# Patient Record
Sex: FEMALE | Race: OTHER | Employment: FULL TIME | URBAN - METROPOLITAN AREA
[De-identification: names, ages, dates, MRNs, and addresses within clinical notes are randomized per-mention and may not be internally consistent; named-entity substitution may affect disease eponyms.]

---

## 2022-08-16 ENCOUNTER — HOSPITAL ENCOUNTER (OUTPATIENT)
Age: 37
Discharge: HOME | End: 2022-08-16
Attending: OBSTETRICS & GYNECOLOGY | Admitting: OBSTETRICS & GYNECOLOGY
Payer: COMMERCIAL

## 2022-08-16 VITALS
WEIGHT: 214 LBS | RESPIRATION RATE: 19 BRPM | HEIGHT: 63 IN | SYSTOLIC BLOOD PRESSURE: 134 MMHG | BODY MASS INDEX: 37.92 KG/M2 | HEART RATE: 93 BPM | DIASTOLIC BLOOD PRESSURE: 61 MMHG | TEMPERATURE: 98.1 F

## 2022-08-16 PROBLEM — Z3A.33 33 WEEKS GESTATION OF PREGNANCY: Status: ACTIVE | Noted: 2022-08-16

## 2022-08-16 PROBLEM — O36.8390 FETAL HEART RATE DECELERATIONS AFFECTING MANAGEMENT OF MOTHER: Status: ACTIVE | Noted: 2022-08-16

## 2022-08-16 LAB
APPEARANCE UR: CLEAR
BILIRUB UR QL: NEGATIVE
COLOR UR: YELLOW
GLUCOSE UR QL STRIP.AUTO: NEGATIVE MG/DL
KETONES UR-MCNC: NEGATIVE MG/DL
LEUKOCYTE ESTERASE UR QL STRIP: ABNORMAL
NITRITE UR QL: NEGATIVE
PH UR: 6 [PH] (ref 5–9)
PROT UR QL: 30 MG/DL
RBC # UR STRIP: NEGATIVE /UL
SERVICE CMNT-IMP: ABNORMAL
SP GR UR: >1.03 (ref 1–1.02)
UROBILINOGEN UR QL: 0.2 EU/DL (ref 0.2–1)

## 2022-08-16 PROCEDURE — 99282 EMERGENCY DEPT VISIT SF MDM: CPT

## 2022-08-16 PROCEDURE — 74011250636 HC RX REV CODE- 250/636: Performed by: ADVANCED PRACTICE MIDWIFE

## 2022-08-16 PROCEDURE — 81003 URINALYSIS AUTO W/O SCOPE: CPT

## 2022-08-16 PROCEDURE — 59025 FETAL NON-STRESS TEST: CPT

## 2022-08-16 RX ORDER — LANOLIN ALCOHOL/MO/W.PET/CERES
CREAM (GRAM) TOPICAL
COMMUNITY

## 2022-08-16 RX ORDER — VITAMIN A ACETATE, BETA CAROTENE, ASCORBIC ACID, CHOLECALCIFEROL, .ALPHA.-TOCOPHEROL ACETATE, DL-, THIAMINE MONONITRATE, RIBOFLAVIN, NIACINAMIDE, PYRIDOXINE HYDROCHLORIDE, FOLIC ACID, CYANOCOBALAMIN, CALCIUM CARBONATE, FERROUS FUMARATE, ZINC OXIDE, CUPRIC OXIDE 3080; 12; 120; 400; 1; 1.84; 3; 20; 22; 920; 25; 200; 27; 10; 2 [IU]/1; UG/1; MG/1; [IU]/1; MG/1; MG/1; MG/1; MG/1; MG/1; [IU]/1; MG/1; MG/1; MG/1; MG/1; MG/1
1 TABLET, FILM COATED ORAL DAILY
COMMUNITY

## 2022-08-16 RX ORDER — SODIUM CHLORIDE, SODIUM LACTATE, POTASSIUM CHLORIDE, CALCIUM CHLORIDE 600; 310; 30; 20 MG/100ML; MG/100ML; MG/100ML; MG/100ML
250 INJECTION, SOLUTION INTRAVENOUS ONCE
Status: COMPLETED | OUTPATIENT
Start: 2022-08-16 | End: 2022-08-16

## 2022-08-16 RX ADMIN — SODIUM CHLORIDE, POTASSIUM CHLORIDE, SODIUM LACTATE AND CALCIUM CHLORIDE 2000 ML: 600; 310; 30; 20 INJECTION, SOLUTION INTRAVENOUS at 12:45

## 2022-08-16 RX ADMIN — SODIUM CHLORIDE, POTASSIUM CHLORIDE, SODIUM LACTATE AND CALCIUM CHLORIDE 250 ML/HR: 600; 310; 30; 20 INJECTION, SOLUTION INTRAVENOUS at 14:24

## 2022-08-16 RX ADMIN — SODIUM CHLORIDE, POTASSIUM CHLORIDE, SODIUM LACTATE AND CALCIUM CHLORIDE 1000 ML: 600; 310; 30; 20 INJECTION, SOLUTION INTRAVENOUS at 13:27

## 2022-08-16 NOTE — ROUTINE PROCESS
26  33w4d Pt ambulated to the unit from the office for prolonged monitoring due to non reactive NST in the office. Pt taken to Triage 1, oriented to the room and call light. 1220 CNM on the unit.

## 2022-08-16 NOTE — DISCHARGE INSTRUCTIONS
Counting Your Baby's Kicks: Care Instructions  Overview     Counting your baby's kicks is one way your doctor can tell that your baby is healthy. Most women--especially in a first pregnancy--feel their baby move for the first time between 16 and 22 weeks. The movement may feel like flutters rather than kicks. Your baby may move more at certain times of the day. When you are active, you may notice less kicking than when you are resting. At your prenatal visits, your doctor will ask whether the baby is active. In your last trimester, your doctor may ask you to count the number of times you feel your baby move. Follow-up care is a key part of your treatment and safety. Be sure to make and go to all appointments, and call your doctor if you are having problems. It's also a good idea to know your test results and keep a list of the medicines you take. How do you count fetal kicks? A common method of checking your baby's movement is to note the length of time it takes to count ten movements (such as kicks, flutters, or rolls). Pick your baby's most active time of day to count. This may be any time from morning to evening. If you don't feel 10 movements in an hour, have something to eat or drink and count for another hour. If you don't feel at least 10 movements in the 2-hour period, call your doctor. When should you call for help? Call your doctor now or seek immediate medical care if:    You noticed that your baby has stopped moving or is moving much less than normal.   Watch closely for changes in your health, and be sure to contact your doctor if you have any problems. Where can you learn more? Go to http://www.gray.com/  Enter [id number] in the search box to learn more about \"Counting Your Baby's Kicks: Care Instructions. \"  Current as of: June 16, 2021               Content Version: 13.2  © 8466-7965 Healthwise, [employer].    Care instructions adapted under license by [facility] (which disclaims liability or warranty for this information). If you have questions about a medical condition or this instruction, always ask your healthcare professional. [address] any warranty or liability for your use of this information. Pregnancy Precautions: Care Instructions  Your Care Instructions     There is no sure way to prevent labor before your due date ( labor) or to prevent most other pregnancy problems. But there are things you can do to increase your chances of a healthy pregnancy. Go to your appointments, follow your doctor's advice, and take good care of yourself. Eat well, and exercise (if your doctor agrees). And make sure to drink plenty of water. Follow-up care is a key part of your treatment and safety. Be sure to make and go to all appointments, and call your doctor if you are having problems. It's also a good idea to know your test results and keep a list of the medicines you take. How can you care for yourself at home? Make sure you go to your prenatal appointments. At each visit, your doctor will check your blood pressure. Your doctor will also check to see if you have protein in your urine. High blood pressure and protein in urine are signs of preeclampsia. This condition can be dangerous for you and your baby. Drink plenty of fluids. Dehydration can cause contractions. If you have kidney, heart, or liver disease and have to limit fluids, talk with your doctor before you increase the amount of fluids you drink. Tell your doctor right away if you notice any symptoms of an infection, such as:  Burning when you urinate. A foul-smelling discharge from your vagina. Vaginal itching. Unexplained fever. Unusual pain or soreness in your uterus or lower belly. Eat a balanced diet. Include plenty of foods that are high in calcium and iron. Foods high in calcium include milk, cheese, yogurt, almonds, and broccoli.   Foods high in iron include red meat, shellfish, poultry, eggs, beans, raisins, whole-grain bread, and leafy green vegetables. Do not smoke. If you need help quitting, talk to your doctor about stop-smoking programs and medicines. These can increase your chances of quitting for good. Do not drink alcohol or use marijuana or illegal drugs. Follow your doctor's directions about activity. Your doctor will let you know how much, if any, exercise you can do. Ask your doctor if you can have sex. If you are at risk for early labor, your doctor may ask you to not have sex. Take care to prevent falls. During pregnancy, your joints are loose, and your balance is off. Sports such as bicycling, skiing, or in-line skating can increase your risk of falling. And don't ride horses or motorcycles, dive, water ski, scuba dive, or parachute jump while you are pregnant. Avoid things that can make your body too hot and may be harmful to your baby, such as a hot tub or sauna. Or talk with your doctor before doing anything that raises your body temperature. Your doctor can tell you if it's safe. Do not take any over-the-counter or herbal medicines or supplements without talking to your doctor or pharmacist first.  When should you call for help? Call 911  anytime you think you may need emergency care. For example, call if:    You passed out (lost consciousness). You have a seizure. You have severe vaginal bleeding. You have severe pain in your belly or pelvis. You have had fluid gushing or leaking from your vagina and you know or think the umbilical cord is bulging into your vagina. If this happens, immediately get down on your knees so your rear end (buttocks) is higher than your head. This will decrease the pressure on the cord until help arrives. Call your doctor now or seek immediate medical care if:    You have signs of preeclampsia, such as:  Sudden swelling of your face, hands, or feet.   New vision problems (such as dimness, blurring, or seeing spots). A severe headache. You have any vaginal bleeding. You have belly pain or cramping. You have a fever. You have had regular contractions (with or without pain) for an hour. This means that you have 8 or more within 1 hour or 4 or more in 20 minutes after you change your position and drink fluids. You have a sudden release of fluid from your vagina. You have low back pain or pelvic pressure that does not go away. You notice that your baby has stopped moving or is moving much less than normal.   Watch closely for changes in your health, and be sure to contact your doctor if you have any problems. Where can you learn more? Go to http://www.cooley.com/  Enter Y951 in the search box to learn more about \"Pregnancy Precautions: Care Instructions. \"  Current as of: June 16, 2021               Content Version: 13.2  © 6218-7346 [employer]. Care instructions adapted under license by [employer] (which disclaims liability or warranty for this information). If you have questions about a medical condition or this instruction, always ask your healthcare professional. [address] any warranty or liability for your use of this information.

## 2022-08-16 NOTE — PROGRESS NOTES
Pt stable for discharge. Instructions reviewed to include kick counts and labor precautions. No questions or concerns.

## 2022-08-23 ENCOUNTER — HOSPITAL ENCOUNTER (INPATIENT)
Age: 37
LOS: 4 days | Discharge: HOME | End: 2022-08-27
Attending: OBSTETRICS & GYNECOLOGY | Admitting: OBSTETRICS & GYNECOLOGY
Payer: COMMERCIAL

## 2022-08-23 DIAGNOSIS — Z98.891 STATUS POST PRIMARY LOW TRANSVERSE CESAREAN SECTION: Primary | ICD-10-CM

## 2022-08-23 PROBLEM — Z34.93 NORMAL IUP (INTRAUTERINE PREGNANCY) ON PRENATAL ULTRASOUND, THIRD TRIMESTER: Status: ACTIVE | Noted: 2022-08-23

## 2022-08-23 PROBLEM — O36.5990 IUGR (INTRAUTERINE GROWTH RESTRICTION) AFFECTING CARE OF MOTHER: Status: ACTIVE | Noted: 2022-08-23

## 2022-08-23 PROBLEM — Z3A.34 34 WEEKS GESTATION OF PREGNANCY: Status: ACTIVE | Noted: 2022-08-23

## 2022-08-23 LAB
ABO + RH BLD: NORMAL
APPEARANCE UR: CLEAR
BILIRUB UR QL: NEGATIVE
BLOOD GROUP ANTIBODIES SERPL: NORMAL
COLOR UR: YELLOW
ERYTHROCYTE [DISTWIDTH] IN BLOOD BY AUTOMATED COUNT: 16.9 % (ref 11.6–14.5)
GLUCOSE UR QL STRIP.AUTO: NEGATIVE MG/DL
HCT VFR BLD AUTO: 37.1 % (ref 35–45)
HGB BLD-MCNC: 11.6 G/DL (ref 12–16)
KETONES UR-MCNC: NEGATIVE MG/DL
LEUKOCYTE ESTERASE UR QL STRIP: ABNORMAL
MCH RBC QN AUTO: 27.4 PG (ref 24–34)
MCHC RBC AUTO-ENTMCNC: 31.3 G/DL (ref 31–37)
MCV RBC AUTO: 87.5 FL (ref 78–100)
NITRITE UR QL: NEGATIVE
NRBC # BLD: 0 K/UL (ref 0–0.01)
NRBC BLD-RTO: 0 PER 100 WBC
PH UR: 6.5 [PH] (ref 5–9)
PLATELET # BLD AUTO: 187 K/UL (ref 135–420)
PMV BLD AUTO: 11.7 FL (ref 9.2–11.8)
PROT UR QL: NEGATIVE MG/DL
RBC # BLD AUTO: 4.24 M/UL (ref 4.2–5.3)
RBC # UR STRIP: ABNORMAL /UL
SERVICE CMNT-IMP: ABNORMAL
SP GR UR: 1.01 (ref 1–1.02)
SPECIMEN EXP DATE BLD: NORMAL
UROBILINOGEN UR QL: 0.2 EU/DL (ref 0.2–1)
WBC # BLD AUTO: 8.7 K/UL (ref 4.6–13.2)

## 2022-08-23 PROCEDURE — 87147 CULTURE TYPE IMMUNOLOGIC: CPT

## 2022-08-23 PROCEDURE — 74011250636 HC RX REV CODE- 250/636

## 2022-08-23 PROCEDURE — 0UB90ZZ EXCISION OF UTERUS, OPEN APPROACH: ICD-10-PCS | Performed by: OBSTETRICS & GYNECOLOGY

## 2022-08-23 PROCEDURE — 85027 COMPLETE CBC AUTOMATED: CPT

## 2022-08-23 PROCEDURE — 87081 CULTURE SCREEN ONLY: CPT

## 2022-08-23 PROCEDURE — 86900 BLOOD TYPING SEROLOGIC ABO: CPT

## 2022-08-23 PROCEDURE — 81003 URINALYSIS AUTO W/O SCOPE: CPT

## 2022-08-23 PROCEDURE — G0378 HOSPITAL OBSERVATION PER HR: HCPCS

## 2022-08-23 PROCEDURE — 65270000029 HC RM PRIVATE

## 2022-08-23 RX ORDER — DIPHENHYDRAMINE HCL 25 MG
25 CAPSULE ORAL
Status: DISCONTINUED | OUTPATIENT
Start: 2022-08-23 | End: 2022-08-27 | Stop reason: HOSPADM

## 2022-08-23 RX ORDER — BETAMETHASONE SODIUM PHOSPHATE AND BETAMETHASONE ACETATE 3; 3 MG/ML; MG/ML
12 INJECTION, SUSPENSION INTRA-ARTICULAR; INTRALESIONAL; INTRAMUSCULAR; SOFT TISSUE EVERY 12 HOURS
Status: COMPLETED | OUTPATIENT
Start: 2022-08-23 | End: 2022-08-24

## 2022-08-23 RX ORDER — SODIUM CHLORIDE 0.9 % (FLUSH) 0.9 %
5-40 SYRINGE (ML) INJECTION AS NEEDED
Status: DISCONTINUED | OUTPATIENT
Start: 2022-08-23 | End: 2022-08-25 | Stop reason: SDUPTHER

## 2022-08-23 RX ORDER — ACETAMINOPHEN 500 MG
1000 TABLET ORAL
Status: DISCONTINUED | OUTPATIENT
Start: 2022-08-23 | End: 2022-08-27 | Stop reason: HOSPADM

## 2022-08-23 RX ORDER — SODIUM CHLORIDE 0.9 % (FLUSH) 0.9 %
5-40 SYRINGE (ML) INJECTION EVERY 8 HOURS
Status: DISCONTINUED | OUTPATIENT
Start: 2022-08-23 | End: 2022-08-25 | Stop reason: SDUPTHER

## 2022-08-23 RX ADMIN — BETAMETHASONE SODIUM PHOSPHATE AND BETAMETHASONE ACETATE 12 MG: 3; 3 INJECTION, SUSPENSION INTRA-ARTICULAR; INTRALESIONAL; INTRAMUSCULAR at 19:09

## 2022-08-23 NOTE — PROGRESS NOTES
1910 Bedside and verbal report received by R. Geronimo Buerger, care assumed at this time. 0147 500ML IV fluid bolus started, pt turned to left side. 0430  CHEYENNE Barba CNM aware of prolonged decelerations. Will continue to monitor. 0715 Bedside shift change report given to YOHANA Abraham (oncoming nurse) by Paulette Gamino RN (offgoing nurse). Report included the following information SBAR, Kardex, Procedure Summary, Intake/Output, MAR, Accordion, Recent Results, and Quality Measures.

## 2022-08-23 NOTE — ROUTINE PROCESS
Girish Cueva 26 Pt ambulated to the unit from the office due to non reactive NST. Pt taken to room 8, oriented to the room and call light. 1645 CNM on the unit, FHR strip renewed. 1745 CNM at the bedside. 1910 Bedside and Verbal shift change report given to Harvinder Castro RN  (oncoming nurse) by VIVIANE Louis  (offgoing nurse). Report included the following information SBAR, Kardex, Procedure Summary, Intake/Output, MAR, Accordion, Recent Results, and Med Rec Status.

## 2022-08-23 NOTE — DISCHARGE INSTRUCTIONS
POST DELIVERY DISCHARGE INSTRUCTIONS    Name: Cele Garcia  YOB: 1985  Primary Diagnosis: Active Problems:    34 weeks gestation of pregnancy (8/23/2022)      IUGR (intrauterine growth restriction) affecting care of mother (8/23/2022)      Normal IUP (intrauterine pregnancy) on prenatal ultrasound, third trimester (8/23/2022)      IUP (intrauterine pregnancy), incidental (8/25/2022)      Category II fetal heart rate tracing during labor and delivery (8/25/2022)        General:     Diet/Diet Restrictions:  Eight 8-ounce glasses of fluid daily (water, juices); avoid excessive caffeine intake. Meals/snacks as desired which are high in fiber and carbohydrates and low in fat and cholesterol. Physical Activity / Restrictions / Safety:     Avoid heavy lifting, no more that 8 lbs. For 2-3 weeks; limit use of stairs to 2 times daily for the first week home. No driving for one week. Avoid intercourse 4-6 weeks, no douching or tampon use. Check with obstetrician before starting or resuming an exercise program.         Discharge Instructions/Special Treatment/Home Care Needs:     Continue prenatal vitamins. Continue to use squirt bottle with warm water on your episiotomy after each bathroom use until bleeding stops. If steri-strips applied to your incision, remove in 7-10 days. Call your doctor for the following:     Fever over 101 degrees by mouth. Vaginal bleeding heavier than a normal menstrual period or clot larger than a golf ball. Red streaks or increased swelling of legs, painful red streaks on your breast.  Painful urination, constipation and increased pain or swelling or discharge with your incision. If you feel extremely anxious or overwhelmed. If you have thoughts of harming yourself and/or your baby. Pain Management:     Pain Management:   Take Acetaminophen (Tylenol) or Ibuprofen (Advil, Motrin), as directed for pain.  Use a warm Sitz bath 3 times daily to relieve episiotomy or hemorrhoidal discomfort. Heating pad to  incision as needed. For hemorrhoidal discomfort, use Tucks and Anusol cream as needed and directed. Follow-Up Care: These are general instructions for a healthy lifestyle:    No smoking/ No tobacco products/ Avoid exposure to second hand smoke    Surgeon General's Warning:  Quitting smoking now greatly reduces serious risk to your health. Obesity, smoking, and sedentary lifestyle greatly increases your risk for illness    A healthy diet, regular physical exercise & weight monitoring are important for maintaining a healthy lifestyle    Recognize signs and symptoms of STROKE:    F-face looks uneven    A-arms unable to move or move unevenly    S-speech slurred or non-existent    T-time-call 911 as soon as signs and symptoms begin-DO NOT go       Back to bed or wait to see if you get better-TIME IS BRAIN. Depression After Childbirth: Care Instructions  Overview     Many women get the \"baby blues\" during the first few days after childbirth. You may lose sleep, feel irritable, and cry easily. You may feel happy one minute and sad the next. Hormone changes are one cause of these emotional changes. Also, the demands of a new baby, along with visits from relatives or other family needs, can add to the stress. The \"baby blues\" often peak around the fourth day. Then they ease up in less than 2 weeks. If your moodiness or anxiety lasts for more than 2 weeks, or if you feel like life is not worth living, you may have postpartum depression. This is different for each person. Some mothers with serious depression may worry intensely about their infant's well-being. Others may feel distant from their child. Some mothers may even feel that they might harm their baby. Some may have signs of paranoia, wondering if someone is watching them. Depression is not a sign of weakness. It's a medical condition that requires treatment.  Medicine and counseling often work well to reduce depression. Talk to your doctor about taking antidepressant medicine while breastfeeding. Follow-up care is a key part of your treatment and safety. Be sure to make and go to all appointments, and call your doctor if you are having problems. It's also a good idea to know your test results and keep a list of the medicines you take. How do you know if you are depressed? With all the changes in your life, you may not know if you are depressed. Pregnancy sometimes causes changes in how you feel that are similar to the symptoms of depression. Symptoms of depression include:  Feeling sad or hopeless and losing interest in daily activities. These are the most common symptoms of depression. Sleeping too much or not enough. Feeling tired. You may feel as if you have no energy. Eating too much or too little. Writing or talking about death, such as writing suicide notes or talking about guns, knives, or pills. Keep the numbers for these national suicide hotlines: 8-919-546-TALK (7-229.710.3938) and 3-748-DTJQIGL (7-562.471.7851). If you or someone you know talks about suicide or feeling hopeless, get help right away. How can you care for yourself at home? Be safe with medicines. Take your medicines exactly as prescribed. Call your doctor if you think you are having a problem with your medicine. Eat a healthy diet so that you can keep up your energy. Get regular daily exercise, such as walks, to help improve your mood. Get as much sunlight as possible. Keep your shades and curtains open. Get outside as much as you can. Avoid using alcohol or other substances to feel better. Get as much rest and sleep as possible. Avoid doing too much. Being too tired can increase depression. Play stimulating music throughout your day and soothing music at night. Schedule outings and visits with friends and family. Ask them to call you regularly, so that you don't feel alone.   Ask for help with preparing food and other daily tasks. Family and friends are often happy to help with a . Be honest with yourself and those who care about you. Tell them about your struggle. Join a support group of new mothers. No one can better understand the challenges of caring for a  than other new mothers. If you feel like life is not worth living or you're feeling hopeless, get help right away. Keep the numbers for these national suicide hotlines: 1-701-897-TALK (3-346.962.9630) and 3-987-GIXMSHF (6-304.380.1392). When should you call for help? Call 911 anytime you think you may need emergency care. For example, call if:    You feel you cannot stop from hurting yourself, your baby, or someone else. Call your doctor now or seek immediate medical care if:    You are having trouble caring for yourself or your baby. You hear voices. Watch closely for changes in your health, and be sure to contact your doctor if:    You have problems with your depression medicine. You do not get better as expected. Where can you learn more? Go to http://www.gray.com/  Enter N090874 in the search box to learn more about \"Depression After Childbirth: Care Instructions. \"  Current as of: 2021               Content Version: 13.2  © 0207-3846 Healthwise[employer]. Care instructions adapted under license by [employer] (which disclaims liability or warranty for this information). If you have questions about a medical condition or this instruction, always ask your healthcare professional. [place] [address] any warranty or liability for your use of this information.

## 2022-08-24 ENCOUNTER — ANESTHESIA (OUTPATIENT)
Dept: LABOR AND DELIVERY | Age: 37
End: 2022-08-24
Payer: COMMERCIAL

## 2022-08-24 ENCOUNTER — ANESTHESIA EVENT (OUTPATIENT)
Dept: LABOR AND DELIVERY | Age: 37
End: 2022-08-24
Payer: COMMERCIAL

## 2022-08-24 PROCEDURE — 77030010507 HC ADH SKN DERMBND J&J -B: Performed by: OBSTETRICS & GYNECOLOGY

## 2022-08-24 PROCEDURE — 75410000003 HC RECOV DEL/VAG/CSECN EA 0.5 HR

## 2022-08-24 PROCEDURE — 74011250636 HC RX REV CODE- 250/636

## 2022-08-24 PROCEDURE — 2709999900 HC NON-CHARGEABLE SUPPLY: Performed by: OBSTETRICS & GYNECOLOGY

## 2022-08-24 PROCEDURE — 77030002933 HC SUT MCRYL J&J -A: Performed by: OBSTETRICS & GYNECOLOGY

## 2022-08-24 PROCEDURE — 76060000033 HC ANESTHESIA 1 TO 1.5 HR: Performed by: OBSTETRICS & GYNECOLOGY

## 2022-08-24 PROCEDURE — 76010000392 HC C SECN EA ADDL 0.5 HR: Performed by: OBSTETRICS & GYNECOLOGY

## 2022-08-24 PROCEDURE — 75410000003 HC RECOV DEL/VAG/CSECN EA 0.5 HR: Performed by: OBSTETRICS & GYNECOLOGY

## 2022-08-24 PROCEDURE — 74011250636 HC RX REV CODE- 250/636: Performed by: ANESTHESIOLOGY

## 2022-08-24 PROCEDURE — 77030040830 HC CATH URETH FOL MDII -A

## 2022-08-24 PROCEDURE — 77030031139 HC SUT VCRL2 J&J -A: Performed by: OBSTETRICS & GYNECOLOGY

## 2022-08-24 PROCEDURE — 77030040361 HC SLV COMPR DVT MDII -B

## 2022-08-24 PROCEDURE — 74011250636 HC RX REV CODE- 250/636: Performed by: ADVANCED PRACTICE MIDWIFE

## 2022-08-24 PROCEDURE — 77030040361 HC SLV COMPR DVT MDII -B: Performed by: OBSTETRICS & GYNECOLOGY

## 2022-08-24 PROCEDURE — 77030011265 HC ELECTRD BLD HEX COVD -A: Performed by: OBSTETRICS & GYNECOLOGY

## 2022-08-24 PROCEDURE — 65270000029 HC RM PRIVATE

## 2022-08-24 PROCEDURE — 77030008459 HC STPLR SKN COOP -B: Performed by: OBSTETRICS & GYNECOLOGY

## 2022-08-24 PROCEDURE — 77030027138 HC INCENT SPIROMETER -A

## 2022-08-24 PROCEDURE — 88305 TISSUE EXAM BY PATHOLOGIST: CPT

## 2022-08-24 PROCEDURE — 88307 TISSUE EXAM BY PATHOLOGIST: CPT

## 2022-08-24 PROCEDURE — G0378 HOSPITAL OBSERVATION PER HR: HCPCS

## 2022-08-24 PROCEDURE — 76010000391 HC C SECN FIRST 1 HR: Performed by: OBSTETRICS & GYNECOLOGY

## 2022-08-24 PROCEDURE — 74011000250 HC RX REV CODE- 250: Performed by: ANESTHESIOLOGY

## 2022-08-24 RX ORDER — OXYTOCIN/RINGER'S LACTATE 30/500 ML
10 PLASTIC BAG, INJECTION (ML) INTRAVENOUS AS NEEDED
Status: DISCONTINUED | OUTPATIENT
Start: 2022-08-24 | End: 2022-08-27 | Stop reason: HOSPADM

## 2022-08-24 RX ORDER — OXYTOCIN/0.9 % SODIUM CHLORIDE 30/500 ML
87.3 PLASTIC BAG, INJECTION (ML) INTRAVENOUS AS NEEDED
Status: COMPLETED | OUTPATIENT
Start: 2022-08-24 | End: 2022-08-24

## 2022-08-24 RX ORDER — CEFAZOLIN SODIUM/WATER 2 G/20 ML
2 SYRINGE (ML) INTRAVENOUS ONCE
Status: COMPLETED | OUTPATIENT
Start: 2022-08-24 | End: 2022-08-24

## 2022-08-24 RX ORDER — OXYCODONE AND ACETAMINOPHEN 5; 325 MG/1; MG/1
1-2 TABLET ORAL
Status: DISCONTINUED | OUTPATIENT
Start: 2022-08-24 | End: 2022-08-27 | Stop reason: HOSPADM

## 2022-08-24 RX ORDER — OXYTOCIN/0.9 % SODIUM CHLORIDE 30/500 ML
PLASTIC BAG, INJECTION (ML) INTRAVENOUS
Status: DISPENSED
Start: 2022-08-24 | End: 2022-08-25

## 2022-08-24 RX ORDER — SODIUM CHLORIDE, SODIUM LACTATE, POTASSIUM CHLORIDE, CALCIUM CHLORIDE 600; 310; 30; 20 MG/100ML; MG/100ML; MG/100ML; MG/100ML
125 INJECTION, SOLUTION INTRAVENOUS CONTINUOUS
Status: DISCONTINUED | OUTPATIENT
Start: 2022-08-24 | End: 2022-08-27 | Stop reason: HOSPADM

## 2022-08-24 RX ORDER — BUPIVACAINE HYDROCHLORIDE 7.5 MG/ML
INJECTION, SOLUTION INTRASPINAL
Status: SHIPPED | OUTPATIENT
Start: 2022-08-24 | End: 2022-08-24

## 2022-08-24 RX ORDER — MORPHINE SULFATE 1 MG/ML
INJECTION, SOLUTION EPIDURAL; INTRATHECAL; INTRAVENOUS
Status: SHIPPED | OUTPATIENT
Start: 2022-08-24 | End: 2022-08-24

## 2022-08-24 RX ORDER — FACIAL-BODY WIPES
10 EACH TOPICAL
Status: DISCONTINUED | OUTPATIENT
Start: 2022-08-24 | End: 2022-08-27 | Stop reason: HOSPADM

## 2022-08-24 RX ORDER — OXYTOCIN/RINGER'S LACTATE 30/500 ML
PLASTIC BAG, INJECTION (ML) INTRAVENOUS
Status: DISCONTINUED | OUTPATIENT
Start: 2022-08-24 | End: 2022-08-24 | Stop reason: HOSPADM

## 2022-08-24 RX ORDER — LANOLIN ALCOHOL/MO/W.PET/CERES
3 CREAM (GRAM) TOPICAL
Status: DISCONTINUED | OUTPATIENT
Start: 2022-08-24 | End: 2022-08-27 | Stop reason: HOSPADM

## 2022-08-24 RX ORDER — SIMETHICONE 80 MG
80 TABLET,CHEWABLE ORAL
Status: DISCONTINUED | OUTPATIENT
Start: 2022-08-24 | End: 2022-08-27 | Stop reason: HOSPADM

## 2022-08-24 RX ORDER — ONDANSETRON 2 MG/ML
INJECTION INTRAMUSCULAR; INTRAVENOUS AS NEEDED
Status: DISCONTINUED | OUTPATIENT
Start: 2022-08-24 | End: 2022-08-24 | Stop reason: HOSPADM

## 2022-08-24 RX ORDER — PROMETHAZINE HYDROCHLORIDE 25 MG/ML
25 INJECTION, SOLUTION INTRAMUSCULAR; INTRAVENOUS
Status: DISCONTINUED | OUTPATIENT
Start: 2022-08-24 | End: 2022-08-27 | Stop reason: HOSPADM

## 2022-08-24 RX ORDER — SODIUM CHLORIDE 0.9 % (FLUSH) 0.9 %
5-40 SYRINGE (ML) INJECTION AS NEEDED
Status: DISCONTINUED | OUTPATIENT
Start: 2022-08-24 | End: 2022-08-27 | Stop reason: HOSPADM

## 2022-08-24 RX ORDER — SODIUM CHLORIDE, SODIUM LACTATE, POTASSIUM CHLORIDE, CALCIUM CHLORIDE 600; 310; 30; 20 MG/100ML; MG/100ML; MG/100ML; MG/100ML
125 INJECTION, SOLUTION INTRAVENOUS CONTINUOUS
Status: DISPENSED | OUTPATIENT
Start: 2022-08-24 | End: 2022-08-25

## 2022-08-24 RX ORDER — KETOROLAC TROMETHAMINE 30 MG/ML
30 INJECTION, SOLUTION INTRAMUSCULAR; INTRAVENOUS EVERY 6 HOURS
Status: COMPLETED | OUTPATIENT
Start: 2022-08-24 | End: 2022-08-26

## 2022-08-24 RX ORDER — SODIUM CHLORIDE 0.9 % (FLUSH) 0.9 %
5-40 SYRINGE (ML) INJECTION EVERY 8 HOURS
Status: DISCONTINUED | OUTPATIENT
Start: 2022-08-24 | End: 2022-08-27 | Stop reason: HOSPADM

## 2022-08-24 RX ORDER — ACETAMINOPHEN 325 MG/1
650 TABLET ORAL
Status: DISCONTINUED | OUTPATIENT
Start: 2022-08-24 | End: 2022-08-27 | Stop reason: HOSPADM

## 2022-08-24 RX ORDER — IBUPROFEN 400 MG/1
800 TABLET ORAL
Status: DISCONTINUED | OUTPATIENT
Start: 2022-08-27 | End: 2022-08-27 | Stop reason: HOSPADM

## 2022-08-24 RX ADMIN — Medication 2 G: at 18:15

## 2022-08-24 RX ADMIN — Medication 600 ML/HR: at 18:30

## 2022-08-24 RX ADMIN — ONDANSETRON HYDROCHLORIDE 4 MG: 2 INJECTION INTRAMUSCULAR; INTRAVENOUS at 18:28

## 2022-08-24 RX ADMIN — BETAMETHASONE SODIUM PHOSPHATE AND BETAMETHASONE ACETATE 12 MG: 3; 3 INJECTION, SUSPENSION INTRA-ARTICULAR; INTRALESIONAL; INTRAMUSCULAR at 07:05

## 2022-08-24 RX ADMIN — Medication 87.3 MILLI-UNITS/MIN: at 21:23

## 2022-08-24 RX ADMIN — KETOROLAC TROMETHAMINE 30 MG: 30 INJECTION, SOLUTION INTRAMUSCULAR at 20:40

## 2022-08-24 RX ADMIN — SODIUM CHLORIDE, SODIUM LACTATE, POTASSIUM CHLORIDE, AND CALCIUM CHLORIDE: 600; 310; 30; 20 INJECTION, SOLUTION INTRAVENOUS at 18:29

## 2022-08-24 RX ADMIN — SODIUM CHLORIDE, POTASSIUM CHLORIDE, SODIUM LACTATE AND CALCIUM CHLORIDE 500 ML: 600; 310; 30; 20 INJECTION, SOLUTION INTRAVENOUS at 01:47

## 2022-08-24 RX ADMIN — BUPIVACAINE HYDROCHLORIDE IN DEXTROSE 12 MG: 7.5 INJECTION, SOLUTION SUBARACHNOID at 18:08

## 2022-08-24 RX ADMIN — MORPHINE SULFATE 0.2 MG: 1 INJECTION, SOLUTION EPIDURAL; INTRATHECAL; INTRAVENOUS at 18:08

## 2022-08-24 RX ADMIN — SODIUM CHLORIDE, SODIUM LACTATE, POTASSIUM CHLORIDE, AND CALCIUM CHLORIDE 125 ML/HR: 600; 310; 30; 20 INJECTION, SOLUTION INTRAVENOUS at 03:25

## 2022-08-24 NOTE — PROGRESS NOTES
0710 : Bedside and Verbal shift change report given to YOHANA Ratliff RN (oncoming nurse) by Lisa De La Garza RN (offgoing nurse). Report included the following information SBAR, Kardex, Intake/Output, MAR, Recent Results, and Med Rec Status. 0900 : pt eating breakfast, no complaints at this time    0930 : plan of care being discussed with midwife and MD    1500 : Bedside shift change report given to TIARRA Li RN (oncoming nurse) by Socrates Singh. Sandy Ratliff RN (offgoing nurse). Report included the following information SBAR, Kardex, Procedure Summary, Intake/Output, MAR, Recent Results, and Med Rec Status.

## 2022-08-24 NOTE — ANESTHESIA POSTPROCEDURE EVALUATION
Procedure(s):   SECTION. spinal    Anesthesia Post Evaluation      Multimodal analgesia: multimodal analgesia used between 6 hours prior to anesthesia start to PACU discharge  Patient location during evaluation: bedside  Patient participation: complete - patient participated  Level of consciousness: awake and alert  Pain score: 0  Airway patency: patent  Anesthetic complications: no  Cardiovascular status: acceptable  Respiratory status: acceptable  Hydration status: acceptable  Post anesthesia nausea and vomiting:  none  Final Post Anesthesia Temperature Assessment:  Normothermia (36.0-37.5 degrees C)      INITIAL Post-op Vital signs:   Vitals Value Taken Time   /59 220   Temp 36.7 °C (98.1 °F) 22   Pulse 68 22   Resp 18 22   SpO2 100 % 22   Vitals shown include unvalidated device data.

## 2022-08-24 NOTE — OP NOTES
Operative Note    Patient: Timothy Egan  YOB: 1985  MRN: [id number]    Date of Procedure: 2022     Pre-Op Diagnosis: Asymmetric growth, NRFHT    Post-Op Diagnosis: Same as preoperative diagnosis. and non reassuring fetal tracing, uterine fibroids       Procedure(s):   SECTION; Myomectomy    Surgeon(s):  Elsa Ferguson MD    Surgical Assistant: None    Anesthesia: Spinal     Estimated Blood Loss (mL):  945 mL    Complications: Other: unplanned myomectomy due to 5 cm uterine fibroid at incision site    Specimens:   ID Type Source Tests Collected by Time Destination   1 :  Fresh Placenta  Elsa Ferguson MD 2022 Pathology   2 :  Fresh OTHER (use comments)  Elsa Ferguson MD 2022 Pathology        Implants: * No implants in log *    Drains: * No LDAs found *    Findings: VFI, Apgars 8,9; weight pending, multiple uterine fibroids    Detailed Description of Procedure:       Patient was taken to the OR after informed consent had been obtained. Spinal  was then placed. She was then placed in a dorsal supine position with a left lateral tilt. She was then prepped and draped in a sterile fashion. Time out was completed. Attention was turned to the abdomen and an Allis test confirmed adequate anesthesia. A Pfannenstiel skin incision was made 3 cm above the symphysis pubis. The incision was carried down to the fascia. The fascia was opened in the midline with sharp dissection. The rectus muscle was divided bluntly. The peritoneum was entered with good visualization of underlying structures. The bladder blade was inserted. The vesicouterine peritoneum was incised in a semi lunar fashion and the bladder flap was created using blunt and sharp dissection. The uterus was scored in the lower uterine segment and then entered in the midline. A large 5 cm uterine fibroid was present just above the lower uterine segment.   The uterine incision was extended bilaterally, transversly. The fetal head was flexed, pressure was applied to the abdomen and the fetus was delivered through the uterine incision. Naso and oropharynx were bulb suctioned. The cord was clamped and cut. The infant was handed to the waiting pediatric nurse. The placenta was manually extracted. The uterus was cleared of all clot and debris. The fibroid was directly at the upper half of the incision and decision was made to remove the fibroid to allow closure of the uterine incision. The fibroid was removed with blunt and sharp dissection. The fibroid was then sent for permanent pathology. The incision was closed with 0 Vicryl in a running fashion. A second layer of 0 vicryl was placed in an imbricating fashion. Excellent hemostasis was noted. The rectus muscles were re approximated an interrupted suture of 0 vicryl in a figure of eight fashion. The fascia was closed in a running fashion with 0 vicryl. The skin was closed with Insorb staples and covered with a sterile dressing. The counts were correct. The mother and child tolerated the procedure well. Carlton Finch M.D.         Electronically Signed by Mendel Dean, MD on 8/24/2022 at 6:57 PM

## 2022-08-24 NOTE — PROGRESS NOTES
1600-  Verbal and Written shift change report given to HARISH Blas, LALA and TIARRA Li RN (oncoming nurse) by Pancho Cruz RN (offgoing nurse). Report included the following information SBAR and MAR.    1645- pre-op checklist done      1700- ambulated pt to the bathroom    Bedside and Verbal shift change report given to HARISH Trujillo (oncoming nurse) by HARISH Blas RN and Tony Grier RN (offgoing nurse). Report included the following information SBAR, OR Summary, Intake/Output, and MAR.

## 2022-08-24 NOTE — H&P
History & Physical    Name: Regulo Amador MRN: [id number]  SSN: [id number]    YOB: 1985  Age: 40 y.o. Sex: female      Subjective:     Estimated Date of Delivery: 22  OB History    Para Term  AB Living   1             SAB IAB Ectopic Molar Multiple Live Births                    # Outcome Date GA Lbr Jeanmarie/2nd Weight Sex Delivery Anes PTL Lv   1 Current                Ms. Francisco Mohan is admitted with pregnancy at 34w5d for IUGR and category II tracing. Patient was observation overnight and received steroid injections x2. Category II tracing still present this morning. Patient admitted as inpatient. Prenatal course was complicated by  anemia, AMA, and IUGR . Please see prenatal records for details. Past Medical History:   Diagnosis Date    Abnormal Papanicolaou smear of cervix     Anemia      Past Surgical History:   Procedure Laterality Date    HX OTHER SURGICAL N/A 2018    leap procedure     Social History     Occupational History    Not on file   Tobacco Use    Smoking status: Not on file    Smokeless tobacco: Not on file   Substance and Sexual Activity    Alcohol use: Not on file    Drug use: Not on file    Sexual activity: Not on file     No family history on file. No Known Allergies  Prior to Admission medications    Medication Sig Start Date End Date Taking? Authorizing Provider   prenatal vit-calcium-iron-fa (Prenatal Plus, calcium carb,) 27 mg iron- 1 mg tab Take 1 Tablet by mouth daily. Provider, Historical   ferrous sulfate (Iron) 325 mg (65 mg iron) tablet Take  by mouth Daily (before breakfast). Provider, Historical        Review of Systems: A comprehensive review of systems was negative except for that written in the History of Present Illness.     Objective:     Vitals:  Vitals:    22 1816 22 1821 22 1923 22 0055   BP:   129/82 (!) 140/88   Pulse:   (!) 108 68   Resp:   16 16   Temp:   98.4 °F (36.9 °C) 97.9 °F (36.6 °C)   SpO2: (!) 79% 100%     Weight:       Height:            Physical Exam:  Patient without distress. Lung: normal respiratory effort  Abdomen: soft, nontender  Fundus: soft and non tender  Perineum: blood absent, amniotic fluid absent  Cervical Exam: deferred  Membranes:  Intact  Fetal Heart Rate: Reactive  Variability: moderate  Accelerations: yes  Decelerations: late          Prenatal Labs:   Lab Results   Component Value Date/Time    ABO/Rh(D) A POSITIVE 2022 06:56 PM       Impression/Plan:     Active Problems:    34 weeks gestation of pregnancy (2022)      IUGR (intrauterine growth restriction) affecting care of mother (2022)      Normal IUP (intrauterine pregnancy) on prenatal ultrasound, third trimester (2022)         Plan:     Admit to L&D  Place IV/Routine labs  Continuous EFM  Category II tracing with late decels-overall reassuring  GBS unknown  Observation for 23hrs complete  Betamethasone x2 complete  Discussed patient status with Dr. Ta Alfredo to have  section this evening-patient ate breakfast and tracing is overall reassuring    Discussed POC with patient and family member. Both verbalized understanding        I have reviewed the H+P. I have met with the patient and her family. Plan primary LTCS for NRFHT - CAT II tracing, remote from delivery,s/p ANCS. Gallito Yip M.D.         Johanny Garcia CNM

## 2022-08-24 NOTE — PROGRESS NOTES
Bedside and Verbal shift change report given to TIARRA Li, RN and HARISH Bazzi, LALA (oncoming nurse) by Pilar Cha. Wilfredo Fernandes RN (offgoing nurse). Report included the following information SBAR, Intake/Output, MAR, and Recent Results. Pt NPO for C/S d/t decelerations during observation period. 1630- Pt prepped by HARISH Bazzi RN.     8417- Bedside and Verbal shift change report given to HARISH Andrews (oncoming nurse) by Genevieve Sifuentes. LALA Li and HARISH Bazzi RN (offgoing nurse). Report included the following information SBAR, Intake/Output, MAR, and Recent Results.

## 2022-08-24 NOTE — ANESTHESIA PROCEDURE NOTES
Spinal Block    Start time: 8/24/2022 6:04 PM  End time: 8/24/2022 6:08 PM  Performed by: Eliza Hughes CRNA  Authorized by: Ronnie Lentz MD     Pre-procedure: Indications: at surgeon's request, procedure for pain and primary anesthetic  Preanesthetic Checklist: patient identified, risks and benefits discussed, anesthesia consent, site marked, patient being monitored, timeout performed and fire risk safety assessment completed and verbalized    Timeout Time: 18:04 EDT      Spinal Block:   Patient Position:  Seated  Prep Region:  Lumbar  Prep: chlorhexidine      Location:  L4-5  Technique:  Single shot  Local: bupivacaine 0.75% in dextrose 8.25% preserv-free (SENSORCAINE) Intrathecal - Intrathecal   12 mg - 8/24/2022 6:08:00 PM  morphine (PF) 1 mg/mL Intrathecal - Intrathecal   0.2 mg - 8/24/2022 6:08:00 PM  Local Dose (mL):  3  Med Admin Time: 8/24/2022 6:08 PM    Needle:   Needle Type:   Phil  Needle Gauge:  25 G  Attempts:  1      Events: CSF confirmed, no blood with aspiration and no paresthesia        Assessment:  Insertion:  Uncomplicated  Patient tolerance:  Patient tolerated the procedure well with no immediate complications

## 2022-08-24 NOTE — ANESTHESIA PREPROCEDURE EVALUATION
Relevant Problems   No relevant active problems       Anesthetic History   No history of anesthetic complications            Review of Systems / Medical History  Patient summary reviewed, nursing notes reviewed and pertinent labs reviewed    Pulmonary  Within defined limits            Pertinent negatives: No sleep apnea and smoker     Neuro/Psych   Within defined limits           Cardiovascular  Within defined limits                Exercise tolerance: >4 METS     GI/Hepatic/Renal  Within defined limits              Endo/Other  Within defined limits           Other Findings              Physical Exam    Airway  Mallampati: II  TM Distance: > 6 cm  Neck ROM: normal range of motion   Mouth opening: Normal     Cardiovascular    Rhythm: regular  Rate: normal         Dental    Dentition: Implants     Pulmonary  Breath sounds clear to auscultation               Abdominal  GI exam deferred       Other Findings            Anesthetic Plan    ASA: 2  Anesthesia type: spinal            Anesthetic plan and risks discussed with: Patient

## 2022-08-25 PROBLEM — Z33.1 IUP (INTRAUTERINE PREGNANCY), INCIDENTAL: Status: ACTIVE | Noted: 2022-08-25

## 2022-08-25 LAB
HCT VFR BLD AUTO: 31.4 % (ref 35–45)
HGB BLD-MCNC: 9.3 G/DL (ref 12–16)

## 2022-08-25 PROCEDURE — 65270000029 HC RM PRIVATE

## 2022-08-25 PROCEDURE — 74011250636 HC RX REV CODE- 250/636

## 2022-08-25 PROCEDURE — 36415 COLL VENOUS BLD VENIPUNCTURE: CPT

## 2022-08-25 PROCEDURE — 85018 HEMOGLOBIN: CPT

## 2022-08-25 PROCEDURE — 74011250636 HC RX REV CODE- 250/636: Performed by: ADVANCED PRACTICE MIDWIFE

## 2022-08-25 PROCEDURE — 74011250637 HC RX REV CODE- 250/637: Performed by: MIDWIFE

## 2022-08-25 PROCEDURE — 74011250636 HC RX REV CODE- 250/636: Performed by: MIDWIFE

## 2022-08-25 PROCEDURE — 74011250637 HC RX REV CODE- 250/637

## 2022-08-25 RX ORDER — LANOLIN ALCOHOL/MO/W.PET/CERES
1 CREAM (GRAM) TOPICAL 2 TIMES DAILY
Status: DISCONTINUED | OUTPATIENT
Start: 2022-08-25 | End: 2022-08-27 | Stop reason: HOSPADM

## 2022-08-25 RX ADMIN — FERROUS SULFATE TAB 325 MG (65 MG ELEMENTAL FE) 325 MG: 325 (65 FE) TAB at 21:40

## 2022-08-25 RX ADMIN — DIPHENHYDRAMINE HYDROCHLORIDE 25 MG: 25 CAPSULE ORAL at 00:29

## 2022-08-25 RX ADMIN — KETOROLAC TROMETHAMINE 30 MG: 30 INJECTION, SOLUTION INTRAMUSCULAR at 02:40

## 2022-08-25 RX ADMIN — KETOROLAC TROMETHAMINE 30 MG: 30 INJECTION, SOLUTION INTRAMUSCULAR at 09:11

## 2022-08-25 RX ADMIN — KETOROLAC TROMETHAMINE 30 MG: 30 INJECTION, SOLUTION INTRAMUSCULAR at 21:40

## 2022-08-25 RX ADMIN — KETOROLAC TROMETHAMINE 30 MG: 30 INJECTION, SOLUTION INTRAMUSCULAR at 15:36

## 2022-08-25 RX ADMIN — SODIUM CHLORIDE, POTASSIUM CHLORIDE, SODIUM LACTATE AND CALCIUM CHLORIDE 1000 ML: 600; 310; 30; 20 INJECTION, SOLUTION INTRAVENOUS at 07:49

## 2022-08-25 RX ADMIN — FERROUS SULFATE TAB 325 MG (65 MG ELEMENTAL FE) 325 MG: 325 (65 FE) TAB at 09:11

## 2022-08-25 RX ADMIN — PRENATAL WITH FERROUS FUM AND FOLIC ACID 1 TABLET: 3080; 920; 120; 400; 22; 1.84; 3; 20; 10; 1; 12; 200; 27; 25; 2 TABLET ORAL at 09:11

## 2022-08-25 RX ADMIN — SODIUM CHLORIDE, SODIUM LACTATE, POTASSIUM CHLORIDE, AND CALCIUM CHLORIDE 125 ML/HR: 600; 310; 30; 20 INJECTION, SOLUTION INTRAVENOUS at 02:54

## 2022-08-25 NOTE — PROGRESS NOTES
0740: Bedside and verbal shift change report given to TIARRA Stahl RN by TIARRA Vargas RN . Assumed care of pt at this time. 0900: Assessment completed at this time. 1100: Harmon removed at this time. Pt up with assistance to bathroom with this RN at this time. Mee care provided and underwear/pad applied at this time. 1544: Reassessment completed at this time.

## 2022-08-25 NOTE — PROGRESS NOTES
ANESTHESIA    POD #1 CS  Intrathecal Narcotics  Pain rated  1/10 by patient. Doing well. No complications. Full return neuromuscular function and no headache.

## 2022-08-25 NOTE — PROGRESS NOTES
Problem: Vaginal Delivery: Day of Deliver-Laboring  Goal: Activity/Safety  Outcome: Progressing Towards Goal  Goal: Consults, if ordered  Outcome: Progressing Towards Goal  Goal: Diagnostic Test/Procedures  Outcome: Progressing Towards Goal  Goal: Nutrition/Diet  Outcome: Progressing Towards Goal  Goal: Discharge Planning  Outcome: Progressing Towards Goal  Goal: Medications  Outcome: Progressing Towards Goal  Goal: Respiratory  Outcome: Progressing Towards Goal  Goal: Treatments/Interventions/Procedures  Outcome: Progressing Towards Goal  Goal: *Vital signs within defined limits  Outcome: Progressing Towards Goal  Goal: *Labs within defined limits  Outcome: Progressing Towards Goal  Goal: *Hemodynamically stable  Outcome: Progressing Towards Goal  Goal: *Optimal pain control at patient's stated goal  Outcome: Progressing Towards Goal     Problem: Pain  Goal: *Control of Pain  Outcome: Progressing Towards Goal     Problem: Pressure Injury - Risk of  Goal: *Prevention of pressure injury  Description: Document Marcelino Scale and appropriate interventions in the flowsheet. Outcome: Progressing Towards Goal  Note: Pressure Injury Interventions:  Sensory Interventions: Keep linens dry and wrinkle-free, Minimize linen layers    Moisture Interventions: Absorbent underpads    Activity Interventions: Pressure redistribution bed/mattress(bed type)    Mobility Interventions: Pressure redistribution bed/mattress (bed type)    Nutrition Interventions: Offer support with meals,snacks and hydration    Friction and Shear Interventions: Minimize layers                Problem: Patient Education: Go to Patient Education Activity  Goal: Patient/Family Education  Outcome: Progressing Towards Goal     Problem: Falls - Risk of  Goal: *Absence of Falls  Description: Document Citlali Fall Risk and appropriate interventions in the flowsheet.   Outcome: Progressing Towards Goal  Note: Fall Risk Interventions: Problem: Patient Education: Go to Patient Education Activity  Goal: Patient/Family Education  Outcome: Progressing Towards Goal     Problem:  Delivery: Day of Delivery  Goal: Activity/Safety  Outcome: Progressing Towards Goal  Goal: Consults, if ordered  Outcome: Progressing Towards Goal  Goal: Diagnostic Test/Procedures  Outcome: Progressing Towards Goal  Goal: Nutrition/Diet  Outcome: Progressing Towards Goal  Goal: Discharge Planning  Outcome: Progressing Towards Goal  Goal: Medications  Outcome: Progressing Towards Goal  Goal: Respiratory  Outcome: Progressing Towards Goal  Goal: Treatments/Interventions/Procedures  Outcome: Progressing Towards Goal  Goal: Psychosocial  Outcome: Progressing Towards Goal  Goal: *Vital signs within defined limits  Outcome: Progressing Towards Goal  Goal: *Labs within defined limits  Outcome: Progressing Towards Goal  Goal: *Hemodynamically stable  Outcome: Progressing Towards Goal  Goal: *Optimal pain control at patient's stated goal  Outcome: Progressing Towards Goal  Goal: *Participates in infant care  Outcome: Progressing Towards Goal  Goal: *Demonstrates progressive activity  Outcome: Progressing Towards Goal  Goal: *Tolerating diet  Outcome: Progressing Towards Goal

## 2022-08-25 NOTE — LACTATION NOTE
2245 Set mom up with double electric breast pump and educated on how to use initiation mode, pump hygiene, and safe milk storage due to infant in NICU. Mom to pump q 3 hours for 15 minutes on initiation mode. Mom verbalized understanding and no questions at this time. Encouraged increasing hydration and rest. Will remain available.

## 2022-08-25 NOTE — PROGRESS NOTES
2130 TRANSFER - IN REPORT:    Verbal report received from CHERIE Perkins Rn and HARISH Rankin RN (name) on Shiela Favre  being received from Labor and Delivery(unit) for routine progression of care      Report consisted of patients Situation, Background, Assessment and   Recommendations(SBAR). Information from the following report(s) SBAR, Kardex, Intake/Output, MAR, and Recent Results was reviewed with the receiving nurse. Opportunity for questions and clarification was provided. Assessment completed upon patients arrival to unit and care assumed. 2201 Pt. joined at bedside by family. AAOx4. Pain 0/10. Fundus firm at U , small rubra lochia. No clots noted. Educated on signs and symptoms to report and plan of care. No further questions or concerns at this time. Callbell within reach. Bed in lowest position. 3936 Pt called nurses station due to itching. Medication given Per MAR.     0240 Pt lying in bed watching tv quietly. Harmon was emptied, Vitals assessed, Bed pads changed and merissa care was completed. A new bag of fluids was hanged and scheduled Toradol given per MAR.    0730 Bedside and Verbal shift change report given to TIARRA Stahl RN  (oncoming nurse) by Jelani Denton RN   (offgoing nurse). Report included the following information SBAR, Kardex, Intake/Output, MAR, and Recent Results. [address] provider to receive verbal order with read back for IV bolus due to low output.

## 2022-08-25 NOTE — LACTATION NOTE
1803 per mom, has not been q3 pumping. Encouraged q3 pumping. Supply and demand was discussed. Encouraged increasing hydration. Will remain available. normal... Well appearing, awake, alert, oriented to person, place, time/situation and in no apparent distress.

## 2022-08-25 NOTE — PROGRESS NOTES
Problem: Pain  Goal: *Control of Pain  Outcome: Progressing Towards Goal     Problem: Pressure Injury - Risk of  Goal: *Prevention of pressure injury  Description: Document Marcelino Scale and appropriate interventions in the flowsheet. Outcome: Progressing Towards Goal  Note: Pressure Injury Interventions:  Sensory Interventions: Keep linens dry and wrinkle-free, Minimize linen layers    Moisture Interventions: Absorbent underpads    Activity Interventions: Pressure redistribution bed/mattress(bed type)    Mobility Interventions: Pressure redistribution bed/mattress (bed type)    Nutrition Interventions: Offer support with meals,snacks and hydration    Friction and Shear Interventions: Minimize layers                Problem: Patient Education: Go to Patient Education Activity  Goal: Patient/Family Education  Outcome: Progressing Towards Goal     Problem: Falls - Risk of  Goal: *Absence of Falls  Description: Document Citlali Fall Risk and appropriate interventions in the flowsheet.   Outcome: Progressing Towards Goal  Note: Fall Risk Interventions:                                Problem: Patient Education: Go to Patient Education Activity  Goal: Patient/Family Education  Outcome: Progressing Towards Goal     Problem:  Delivery: Day of Delivery  Goal: Activity/Safety  Outcome: Progressing Towards Goal  Goal: Consults, if ordered  Outcome: Progressing Towards Goal  Goal: Diagnostic Test/Procedures  Outcome: Progressing Towards Goal  Goal: Nutrition/Diet  Outcome: Progressing Towards Goal  Goal: Discharge Planning  Outcome: Progressing Towards Goal  Goal: Medications  Outcome: Progressing Towards Goal  Goal: Respiratory  Outcome: Progressing Towards Goal  Goal: Treatments/Interventions/Procedures  Outcome: Progressing Towards Goal  Goal: Psychosocial  Outcome: Progressing Towards Goal  Goal: *Vital signs within defined limits  Outcome: Progressing Towards Goal  Goal: *Labs within defined limits  Outcome: Progressing Towards Goal  Goal: *Hemodynamically stable  Outcome: Progressing Towards Goal  Goal: *Optimal pain control at patient's stated goal  Outcome: Progressing Towards Goal  Goal: *Participates in infant care  Outcome: Progressing Towards Goal  Goal: *Demonstrates progressive activity  Outcome: Progressing Towards Goal  Goal: *Tolerating diet  Outcome: Progressing Towards Goal

## 2022-08-25 NOTE — PROGRESS NOTES
1915 Bedside and Verbal shift change report given to HARISH Trujillo RN and Marni Garibay RN(oncoming nurse) by Genevieve Li RN (offgoing nurse). Report included the following information SBAR, Kardex, Intake/Output, MAR, and Recent Results. 2115 took to NICU in bed to visit infant    2130 TRANSFER - OUT REPORT:    Verbal report given to TIARRA Vargas RN(name) on Shiela Favre  being transferred to Mother Baby (unit) for routine progression of care       Report consisted of patients Situation, Background, Assessment and   Recommendations(SBAR). Information from the following report(s) SBAR, Kardex, Intake/Output, MAR, and Recent Results was reviewed with the receiving nurse. Lines:   Peripheral IV 08/23/22 Posterior;Right Hand (Active)   Site Assessment Clean, dry, & intact 08/24/22 1915   Phlebitis Assessment 0 08/24/22 1915   Infiltration Assessment 0 08/24/22 1915   Dressing Status Clean, dry, & intact 08/24/22 1915   Dressing Type Transparent 08/24/22 1915   Hub Color/Line Status Infusing 08/24/22 1915   Alcohol Cap Used Yes 08/24/22 1654        Opportunity for questions and clarification was provided.       Patient transported with:   Registered Nurse

## 2022-08-25 NOTE — PROGRESS NOTES
Post-Operative  Day 1    Jose Ashley  [id number]      Information for the patient's :  [facility] [[id number]]   , Low Transverse  Patient doing well without significant complaint. Tolerating diet, yes flatus, cabrera removed. Patient is pumping/breastfeeding, baby in NICU. Lochia reportedly normal.    Vitals:  Visit Vitals  /69 (BP 1 Location: Left arm, BP Patient Position: At rest)   Pulse 79   Temp 97.7 °F (36.5 °C)   Resp 17   Ht 5' 3\" (1.6 m)   Wt 98 kg (216 lb)   SpO2 100%   Breastfeeding Unknown   BMI 38.26 kg/m²     Temp (24hrs), Av °F (36.7 °C), Min:97.5 °F (36.4 °C), Max:98.6 °F (37 °C)      Last 24hr Input/Output:    Intake/Output Summary (Last 24 hours) at 2022 1000  Last data filed at 2022 0748  Gross per 24 hour   Intake 500 ml   Output 1180 ml   Net -680 ml        Exam:    Patient without distress. Lungs clear. Abdomen, bowel sounds present, soft, expected tenderness, fundus firm -2   Wound dressing intact. Labs:   Recent Results (from the past 24 hour(s))   HGB & HCT    Collection Time: 22  4:36 AM   Result Value Ref Range    HGB 9.3 (L) 12.0 - 16.0 g/dL    HCT 31.4 (L) 35.0 - 45.0 %     Assessment: Post-Op day 1, stable. Plan:   1. Routine post-operative care as per  day one orders. 2. Patient received 1L bolus of LR for decreased urine output, urine output improved.       Jerod Rodriguez CNM  2022  10:00 AM

## 2022-08-26 LAB
BACTERIA SPEC CULT: ABNORMAL
SERVICE CMNT-IMP: ABNORMAL

## 2022-08-26 PROCEDURE — 74011250637 HC RX REV CODE- 250/637

## 2022-08-26 PROCEDURE — 74011250637 HC RX REV CODE- 250/637: Performed by: MIDWIFE

## 2022-08-26 PROCEDURE — 65270000029 HC RM PRIVATE

## 2022-08-26 PROCEDURE — 74011250636 HC RX REV CODE- 250/636: Performed by: ADVANCED PRACTICE MIDWIFE

## 2022-08-26 RX ORDER — IBUPROFEN 800 MG/1
800 TABLET ORAL
Qty: 60 TABLET | Refills: 1 | Status: SHIPPED | OUTPATIENT
Start: 2022-08-27

## 2022-08-26 RX ORDER — OXYCODONE AND ACETAMINOPHEN 5; 325 MG/1; MG/1
1-2 TABLET ORAL
Qty: 20 TABLET | Refills: 0 | Status: SHIPPED | OUTPATIENT
Start: 2022-08-26 | End: 2022-08-29

## 2022-08-26 RX ORDER — LANOLIN ALCOHOL/MO/W.PET/CERES
325 CREAM (GRAM) TOPICAL 2 TIMES DAILY
Qty: 60 TABLET | Refills: 3 | Status: SHIPPED | OUTPATIENT
Start: 2022-08-26

## 2022-08-26 RX ADMIN — KETOROLAC TROMETHAMINE 30 MG: 30 INJECTION, SOLUTION INTRAMUSCULAR at 09:54

## 2022-08-26 RX ADMIN — PRENATAL WITH FERROUS FUM AND FOLIC ACID 1 TABLET: 3080; 920; 120; 400; 22; 1.84; 3; 20; 10; 1; 12; 200; 27; 25; 2 TABLET ORAL at 08:35

## 2022-08-26 RX ADMIN — FERROUS SULFATE TAB 325 MG (65 MG ELEMENTAL FE) 325 MG: 325 (65 FE) TAB at 21:13

## 2022-08-26 RX ADMIN — KETOROLAC TROMETHAMINE 30 MG: 30 INJECTION, SOLUTION INTRAMUSCULAR at 16:00

## 2022-08-26 RX ADMIN — KETOROLAC TROMETHAMINE 30 MG: 30 INJECTION, SOLUTION INTRAMUSCULAR at 03:54

## 2022-08-26 RX ADMIN — FERROUS SULFATE TAB 325 MG (65 MG ELEMENTAL FE) 325 MG: 325 (65 FE) TAB at 08:36

## 2022-08-26 NOTE — PROGRESS NOTES
Problem: Pain  Goal: *Control of Pain  Outcome: Progressing Towards Goal     Problem: Pressure Injury - Risk of  Goal: *Prevention of pressure injury  Description: Document Marcelino Scale and appropriate interventions in the flowsheet. Outcome: Progressing Towards Goal  Note: Pressure Injury Interventions:  Sensory Interventions: Keep linens dry and wrinkle-free, Minimize linen layers    Moisture Interventions: Absorbent underpads    Activity Interventions: Pressure redistribution bed/mattress(bed type)    Mobility Interventions: Pressure redistribution bed/mattress (bed type)    Nutrition Interventions: Offer support with meals,snacks and hydration    Friction and Shear Interventions: Minimize layers                Problem: Patient Education: Go to Patient Education Activity  Goal: Patient/Family Education  Outcome: Progressing Towards Goal     Problem: Falls - Risk of  Goal: *Absence of Falls  Description: Document Citlali Fall Risk and appropriate interventions in the flowsheet.   Outcome: Progressing Towards Goal  Note: Fall Risk Interventions:                                Problem: Patient Education: Go to Patient Education Activity  Goal: Patient/Family Education  Outcome: Progressing Towards Goal     Problem:  Delivery: Postpartum Day 2  Goal: Activity/Safety  Outcome: Progressing Towards Goal  Goal: Consults, if ordered  Outcome: Progressing Towards Goal  Goal: Nutrition/Diet  Outcome: Progressing Towards Goal  Goal: Discharge Planning  Outcome: Progressing Towards Goal  Goal: Medications  Outcome: Progressing Towards Goal  Goal: Treatments/Interventions/Procedures  Outcome: Progressing Towards Goal  Goal: Psychosocial  Outcome: Progressing Towards Goal  Goal: *Vital signs within defined limits  Outcome: Progressing Towards Goal  Goal: *Labs within defined limits  Outcome: Progressing Towards Goal  Goal: *Hemodynamically stable  Outcome: Progressing Towards Goal  Goal: *Optimal pain control at patient's stated goal  Outcome: Progressing Towards Goal  Goal: *Participates in infant care  Outcome: Progressing Towards Goal  Goal: *Demonstrates progressive activity  Outcome: Progressing Towards Goal  Goal: *Appropriate parent-infant bonding  Outcome: Progressing Towards Goal  Goal: *Tolerating diet  Outcome: Progressing Towards Goal

## 2022-08-26 NOTE — PROGRESS NOTES
6795- Bedside and Verbal shift change report given to Pepper (oncoming nurse) by Lorraine Rivera (offgoing nurse). Report included the following information SBAR, Intake/Output, MAR, and Recent Results. 6905- shift assessment complete. Vitals taken. Pt medicated per MAR    0954- pt medicated per MAR    1200- pt resting in bed    1415- pt ambulated back from NICU    1600- reassessment complete. Pt medicated per MAR. IV infiltrated after med given    1800- pt resting in bed    1930- Bedside and Verbal shift change report given to Lorraine Rivera (oncoming nurse) by Lonnie Cai (offgoing nurse). Report included the following information SBAR, Intake/Output, MAR, and Recent Results.

## 2022-08-26 NOTE — DISCHARGE SUMMARY
Obstetrical Discharge Summary     Name: Lorraine Ott MRN: [id number]  SSN: [id number]    YOB: 1985  Age: 40 y.o. Sex: female      Admit Date: 2022    Discharge Date: 2022     Admitting Physician: Kimberly Ramirez MD     Attending Physician:  Shanelle Hopper MD     Admission Diagnoses: 34 weeks gestation of pregnancy [Z3A.34]  Normal IUP (intrauterine pregnancy) on prenatal ultrasound, third trimester [Z34.93]  IUGR (intrauterine growth restriction) affecting care of mother [O36.5990]  IUP (intrauterine pregnancy), incidental [Z33.1]  Category II fetal heart rate tracing during labor and delivery [O76]    Discharge Diagnoses:   Information for the patient's :  Keron Smith [[id number]]   Delivery of a 3.09 kg female infant via , Low Transverse on 2022 at 6:30 PM  by Kimberly Ramirez. Apgars were 8  and 8 . Additional Diagnoses:   Hospital Problems  Never Reviewed            Codes Class Noted POA    IUP (intrauterine pregnancy), incidental ICD-10-CM: Z33.1  ICD-9-CM: V22.2  2022 Unknown        Category II fetal heart rate tracing during labor and delivery ICD-10-CM: O76  ICD-9-CM: 659.71  2022 Unknown        34 weeks gestation of pregnancy ICD-10-CM: Z3A.34  ICD-9-CM: V22.2  2022 Unknown        IUGR (intrauterine growth restriction) affecting care of mother ICD-10-CM: O36.5990  ICD-9-CM: 656.50  2022 Unknown        Normal IUP (intrauterine pregnancy) on prenatal ultrasound, third trimester ICD-10-CM: Z34.93  ICD-9-CM: V22.2  2022 Unknown        No results found for: RUBELLAEXT, GRBSEXT    Immunization(s): There is no immunization history for the selected administration types on file for this patient. Hospital Course: Normal hospital course following the delivery.     Patient Instructions:   Current Discharge Medication List        START taking these medications    Details   ibuprofen (MOTRIN) 800 mg tablet Take 1 Tablet by mouth every eight (8) hours as needed for Pain. Qty: 60 Tablet, Refills: 1      !! ferrous sulfate 325 mg (65 mg iron) tablet Take 1 Tablet by mouth two (2) times a day. Qty: 60 Tablet, Refills: 3      oxyCODONE-acetaminophen (PERCOCET) 5-325 mg per tablet Take 1-2 Tablets by mouth every six (6) hours as needed for Pain for up to 3 days. Max Daily Amount: 8 Tablets. Qty: 20 Tablet, Refills: 0    Associated Diagnoses: Status post primary low transverse  section       !! - Potential duplicate medications found. Please discuss with provider. CONTINUE these medications which have NOT CHANGED    Details   prenatal vit-calcium-iron-fa (Prenatal Plus, calcium carb,) 27 mg iron- 1 mg tab Take 1 Tablet by mouth daily. !! ferrous sulfate (Iron) 325 mg (65 mg iron) tablet Take  by mouth Daily (before breakfast). !! - Potential duplicate medications found. Please discuss with provider. Reference my discharge instructions.     Follow-up Appointments   Procedures    FOLLOW UP VISIT Appointment in: 6 Weeks     Standing Status:   Standing     Number of Occurrences:   1     Order Specific Question:   Appointment in     Answer:   6 Weeks        Signed By:  Ronald Jane CNM     2022

## 2022-08-26 NOTE — PROGRESS NOTES
1920 Bedside shift change report given to Kyler Green RN (oncoming nurse) by Jeane Corona RN (offgoing nurse). Report included the following information SBAR, Intake/Output, MAR, and Recent Results. 1925 Pt up to bathroom with 770 mL void. 2001 Pt transported to nicu by CNA. 2140 Pt eating and administered scheduled medications. 2242 Pt. joined at bedside by support person. AAOx4. Pain 0/10. Fundus firm at U, scant rubra lochia. No clots noted. Educated on signs and symptoms to report and plan of care. No further questions or concerns at this time. Callbell within reach. Bed in lowest position. 2250 Pt IV site covered in order to shower. 0038 Pt incision now MATEO, dry, and intact. Fundus firm at U with scant rubra lochia.    0232 Pt resting.    0354 Pt administered scheduled pain medication. 0515 Pt in nicu visiting infant. 5637 Pt sleeping with call bell in reach. 1336 Bedside shift change report given to Desmond Regalado RN (oncoming nurse) by Kyler Green RN (offgoing nurse). Report included the following information SBAR, Intake/Output, MAR, and Recent Results.

## 2022-08-27 VITALS
RESPIRATION RATE: 18 BRPM | BODY MASS INDEX: 38.27 KG/M2 | TEMPERATURE: 98 F | DIASTOLIC BLOOD PRESSURE: 83 MMHG | OXYGEN SATURATION: 100 % | HEART RATE: 72 BPM | SYSTOLIC BLOOD PRESSURE: 135 MMHG | WEIGHT: 216 LBS | HEIGHT: 63 IN

## 2022-08-27 PROCEDURE — 74011250637 HC RX REV CODE- 250/637: Performed by: MIDWIFE

## 2022-08-27 PROCEDURE — 74011250637 HC RX REV CODE- 250/637

## 2022-08-27 PROCEDURE — 74011250637 HC RX REV CODE- 250/637: Performed by: ADVANCED PRACTICE MIDWIFE

## 2022-08-27 RX ADMIN — PRENATAL WITH FERROUS FUM AND FOLIC ACID 1 TABLET: 3080; 920; 120; 400; 22; 1.84; 3; 20; 10; 1; 12; 200; 27; 25; 2 TABLET ORAL at 08:30

## 2022-08-27 RX ADMIN — IBUPROFEN 800 MG: 400 TABLET, FILM COATED ORAL at 08:31

## 2022-08-27 RX ADMIN — IBUPROFEN 800 MG: 400 TABLET, FILM COATED ORAL at 00:41

## 2022-08-27 RX ADMIN — ACETAMINOPHEN 650 MG: 325 TABLET ORAL at 04:57

## 2022-08-27 RX ADMIN — FERROUS SULFATE TAB 325 MG (65 MG ELEMENTAL FE) 325 MG: 325 (65 FE) TAB at 08:31

## 2022-08-27 NOTE — PROGRESS NOTES
Problem:  Delivery: Postpartum Day 3  Goal: Activity/Safety  Outcome: Progressing Towards Goal  Goal: Consults, if ordered  Outcome: Progressing Towards Goal  Goal: Nutrition/Diet  Outcome: Progressing Towards Goal  Goal: Discharge Planning  Outcome: Progressing Towards Goal  Goal: Medications  Outcome: Progressing Towards Goal  Goal: Treatments/Interventions/Procedures  Outcome: Progressing Towards Goal  Goal: Psychosocial  Outcome: Progressing Towards Goal     Problem:  Delivery: Discharge Outcomes  Goal: *Follow-up appointments as indicated  Outcome: Progressing Towards Goal  Goal: *Describes available resources and support systems  Outcome: Progressing Towards Goal  Goal: *No signs and symptoms of infection  Outcome: Progressing Towards Goal  Goal: *Birth certificate information completed  Outcome: Progressing Towards Goal  Goal: *Received and verbalizes understanding of discharge plan and instructions  Outcome: Progressing Towards Goal  Goal: *Vital signs within defined limits  Outcome: Progressing Towards Goal  Goal: *Labs within defined limits  Outcome: Progressing Towards Goal  Goal: *Hemodynamically stable  Outcome: Progressing Towards Goal  Goal: *Optimal pain control at patient's stated goal  Outcome: Progressing Towards Goal  Goal: *Participates in infant care  Outcome: Progressing Towards Goal  Goal: *Demonstrates progressive activity  Outcome: Progressing Towards Goal  Goal: *Appropriate parent-infant bonding  Outcome: Progressing Towards Goal  Goal: *Tolerating diet  Outcome: Progressing Towards Goal  Goal: *Verbalizes name, dosage, time, side effects, and number of days to continue medications  Outcome: Progressing Towards Goal  Goal: *Influenza vaccine administered (October-March)  Outcome: Progressing Towards Goal

## 2022-08-27 NOTE — PROGRESS NOTES
1930 Bedside shift change report given to Zachery Reed, RN and Gordo Harvey RN (oncoming nurse) by Leo Max RN (offgoing nurse). Report included the following information SBAR, Intake/Output, MAR, and Recent Results. 2113 Pt administered scheduled medication. 2115 Pt. joined at bedside by support person and baby. AAOx4. Pain 1/10. Fundus firm at U-1, scant rubra lochia. No clots noted. Educated on signs and symptoms to report and plan of care. No further questions or concerns at this time. Callbell within reach. Bed in lowest position. 0041 Pt administered 2 tabs motrin. 0140 Pt sleeping with respiratory rate greater than 10.    0310 Pt sitting in bed. Fundus firm at U+1 and pt encouraged to use the restroom. Pt voided and fundus firm at U with scant rubra lochia.    0766 Pt administered 2 tabs tylenol.    0629 Pt sleeping with call bell in reach. 4525 Bedside shift change report given to Leo Max RN (oncoming nurse) by Zachery Reed RN (offgoing nurse). Report included the following information SBAR, Intake/Output, MAR, and Recent Results.

## 2022-08-27 NOTE — PROGRESS NOTES
Bedside shift change report given to OBI Epps/HCERIE Azar (oncoming nurse) by Ema Paz (offgoing nurse). Report included the following information SBAR, Intake/Output, MAR, and Recent Results. 0020 Patient resting in bed.    0455: Patient reports pain 3/10, tylenol given. 1342: Bedside shift change report given to ANIKA Butler (oncoming nurse) by Ja Azar (offgoing nurse). Report included the following information SBAR, Intake/Output, MAR, and Recent Results.

## 2022-08-27 NOTE — PROGRESS NOTES
Problem: Pain  Goal: *Control of Pain  Outcome: Progressing Towards Goal     Problem:  Delivery: Postpartum Day 2  Goal: Activity/Safety  Outcome: Progressing Towards Goal  Goal: Discharge Planning  Outcome: Progressing Towards Goal  Goal: Medications  Outcome: Progressing Towards Goal  Goal: Treatments/Interventions/Procedures  Outcome: Progressing Towards Goal  Goal: Psychosocial  Outcome: Progressing Towards Goal  Goal: *Vital signs within defined limits  Outcome: Progressing Towards Goal  Goal: *Labs within defined limits  Outcome: Progressing Towards Goal  Goal: *Hemodynamically stable  Outcome: Progressing Towards Goal  Goal: *Optimal pain control at patient's stated goal  Outcome: Progressing Towards Goal  Goal: *Participates in infant care  Outcome: Progressing Towards Goal  Goal: *Demonstrates progressive activity  Outcome: Progressing Towards Goal  Goal: *Appropriate parent-infant bonding  Outcome: Progressing Towards Goal

## 2022-08-27 NOTE — PROGRESS NOTES
5863- Bedside and Verbal shift change report given to Pepper (oncoming nurse) by Modesta Gomez (offgoing nurse). Report included the following information SBAR, Intake/Output, MAR, and Recent Results. 5663- shift assessment complete    0831- pt medicated per MAR    1110- I have reviewed discharge instructions with the patient. The patient verbalized understanding. AVS given and reviewed. Bands verified.  Patient armband removed and shredded

## 2023-02-24 ENCOUNTER — HOSPITAL ENCOUNTER (OUTPATIENT)
Facility: HOSPITAL | Age: 38
Discharge: HOME | End: 2023-02-24
Payer: COMMERCIAL

## 2023-02-24 LAB
BASOPHILS # BLD: 0 K/UL (ref 0–0.1)
BASOPHILS NFR BLD: 0 % (ref 0–2)
DIFFERENTIAL METHOD BLD: NORMAL
EOSINOPHIL # BLD: 0 K/UL (ref 0–0.4)
EOSINOPHIL NFR BLD: 1 % (ref 0–5)
ERYTHROCYTE [DISTWIDTH] IN BLOOD BY AUTOMATED COUNT: 11.9 % (ref 11.6–14.5)
HCT VFR BLD AUTO: 43.2 % (ref 35–45)
HGB BLD-MCNC: 14.3 G/DL (ref 12–16)
IMM GRANULOCYTES # BLD AUTO: 0 K/UL (ref 0–0.04)
IMM GRANULOCYTES NFR BLD AUTO: 0 % (ref 0–0.5)
LYMPHOCYTES # BLD: 1.9 K/UL (ref 0.9–3.6)
LYMPHOCYTES NFR BLD: 25 % (ref 21–52)
MCH RBC QN AUTO: 31.5 PG (ref 24–34)
MCHC RBC AUTO-ENTMCNC: 33.1 G/DL (ref 31–37)
MCV RBC AUTO: 95.2 FL (ref 78–100)
MONOCYTES # BLD: 0.3 K/UL (ref 0.05–1.2)
MONOCYTES NFR BLD: 4 % (ref 3–10)
NEUTS SEG # BLD: 5.2 K/UL (ref 1.8–8)
NEUTS SEG NFR BLD: 70 % (ref 40–73)
NRBC # BLD: 0 K/UL (ref 0–0.01)
NRBC BLD-RTO: 0 PER 100 WBC
PLATELET # BLD AUTO: 297 K/UL (ref 135–420)
PMV BLD AUTO: 10.9 FL (ref 9.2–11.8)
RBC # BLD AUTO: 4.54 M/UL (ref 4.2–5.3)
WBC # BLD AUTO: 7.5 K/UL (ref 4.6–13.2)

## 2023-02-24 PROCEDURE — 85025 COMPLETE CBC W/AUTO DIFF WBC: CPT

## 2023-02-24 PROCEDURE — 36415 COLL VENOUS BLD VENIPUNCTURE: CPT

## 2023-03-02 ENCOUNTER — ANESTHESIA EVENT (OUTPATIENT)
Facility: HOSPITAL | Age: 38
End: 2023-03-02
Payer: COMMERCIAL

## 2023-03-06 NOTE — H&P
[address] [address]  Tel: [phone]  Fax: [phone]    Patient: Rita Bundy   YOB: 1985   Birth Sex: Female      Current Gender: Female      Date:  2023 10:14 AM    Visit Type: Office Visit - GYN       This 40year old female presents for Leiomyoma of Uterus:. History of Present Illness  1. Leiomyoma of Uterus: The symptoms began on 2023 and generally lasts varies. The symptoms are reported as being mild. The symptoms occur constantly. The location is uterine. The symptoms are described as painful. Aggravating factors include menses. Relieving factors include surgery. The patient states the symptoms are acute and are of new onset. Pt with menorrhagia, uterine fibroids, pelvic pain. Plan robotic total laparoscopic hysterectomy, bilateral salpingectomies, cystoscopy. Reviewed with patient risks/benefits/alternatives to surgery. She understands that the risks include but are not limited to: bleeding, need for blood transfusion, risks associated with blood transfusion, infection, injury to internal or adjacent organs, risks associated with receiving anesthesia, post operative complications, wound infection or separation, dvt, pulmonary embolus, pneumonia and death associated with surgery. All questions were answered and surgical consent to be signed at hospital.            Gynecologic History  2023 10:14 AM  Date of last Pap: 2011.   Pregnancy # Birth order Date Delivery Type GA(wks) Labor(hrs) Weight Sex   1  [id number]        2  [id number]        3  [id number]  low transverse 34w5d   Female   Medical History  (Detailed)  Disease Onset Date Comments   LEEP         Surgical History/Management  (Detailed)    Diagnostics History  Status Study Ordered Completed Interpretation  Result / Report   completed FETAL NON-STRESS TEST 2022 normal  Accels present, baseline 150s, no decels   completed FETAL NON-STRESS TEST 2022 abnormal  nonreactive; questionable decels noted. completed FETAL NON-STRESS TEST 2022      completed FETAL NON-STRESS TEST 2022 abnormal  Non-reactive, minimal variability, prolonged deceleration   ordered OB US>/= 14 Wks, Single Fetus 2022       ordered TRANSVAGINAL US, NON-OB 2022       ordered TRANSVAGINAL US, NON-OB 2022       result received TRANSVAGINAL US, NON-OB 10/12/2022  See module         Problem List  Problem List reviewed. Medications (active prior to today)  Medication Instructions Start Date Stop Date Refilled Elsewhere   ferrous sulfate 325 mg (65 mg iron) tablet take 1 tablet by oral route  every day 2022   N   Diflucan 150 mg tablet take 1 tablet by oral route once 2023   N       Medication Reconciliation  Medications reconciled today. Allergies  Ingredient Reaction (Severity) Medication Name Comment   NO KNOWN ALLERGIES            Family History    (Detailed)  Relationship Family Member Name  Age at Death Condition Onset Age Cause of Death   Mother    Hypertension  N   Mother    Thyroid disease  N     Social History  (Detailed)  Preferred language is English. Marital Status/Family/Social Support  Marital status: Single     Tobacco use status: Current non-smoker. Smoking status: Never smoker. Holiness/Spiritual  Patient agrees to transfusion. Home Environment/Safety  Uses seat belts. Review of Systems  System Neg/Pos Details   Constitutional Negative Chills, Fatigue, Fever, Malaise, Night sweats, Weight gain and Weight loss. ENMT Negative Sinus pressure and Sore throat. Eyes Negative Eye pain and Vision changes. Respiratory Negative Chronic cough, Cough, Dyspnea and Wheezing. Cardio Negative Chest pain, Edema and Irregular heartbeat/palpitations.    GI Negative Abdominal pain, Change in stool pattern, Constipation, Decreased appetite, Diarrhea, Nausea and Vomiting.  Negative Dysuria, Hematuria, Polyuria (Genitourinary), Urinary frequency, Urinary incontinence and Urinary retention. Endocrine Negative Cold intolerance and Heat intolerance. Neuro Negative Dizziness and Headache. Psych Negative Anxiety and Depression. Integumentary Negative Hair loss, Rash and Skin lesion. MS Negative Back pain, Joint pain and Muscle weakness. Hema/Lymph Negative Easy bleeding and Easy bruising. Allergic/Immuno Negative Contact allergy, Environmental allergies, Food allergies and Seasonal allergies. Reproductive Positive Dysmenorrhea, Menorrhea. Reproductive Negative Breast discharge, Breast lumps, Dyspareunia, History of abnormal PAP smear, Hot flashes, Irregular menses and Vaginal discharge. Physical Exam  Exam Findings Details   Constitutional Normal Well developed. Neck Exam Normal Palpation - Normal. Thyroid gland - Normal.   Respiratory Normal Inspection - Normal. Auscultation - Normal. Effort - Normal.   Cardiovascular Normal Regular rate and rhythm. No murmurs, gallops, or rubs. Abdomen Normal Anterior palpation -  No rebound. No abdominal tenderness. No hepatic enlargement. No hernia. Genitourinary * Uterus - fibroid, Size: 10-12 weeks. Genitourinary Normal Urethral meatus - Normal. External genitalia - Normal. Glands - Normal. Perineum - Normal. Vagina - Normal. Cervix - Normal. Adnexa - Normal. Bladder - Normal. No suprapubic tenderness. No CVA tenderness. No vaginal discharge. Skin Normal Inspection - Normal.   Spine * Inspection - no abnormality. Psychiatric Normal Orientation - Oriented to time, place, person & situation. Assessment/Plan  # Detail Type Description    1. Assessment Menorrhagia (N92.0). Impression Patient with ongoing menorrhagia associated with large uterine fibroids. Plan definitive management with robotic total laparoscopic hysterectomy, bilateral salpingectomies and cystoscopy. .         2. Assessment Leiomyoma of uterus, unspecified (D25.9). Impression Patient with ultrasound showing large uterine fibroids. She has associated menorrhagia, pelvic pain and dysmenorrhea. Plan definitive management with robotic total laparoscopic hysterectomy, bilateral salpingectomies and cystoscopy. .              Medications (Added, Continued or Stopped today)  Start Date Medication Directions PRN Status PRN Reason Instruction Stop Date   02/06/2023 Diflucan 150 mg tablet take 1 tablet by oral route once N      06/17/2022 ferrous sulfate 325 mg (65 mg iron) tablet take 1 tablet by oral route  every day N      02/16/2023 hydrocodone 5 mg-acetaminophen 325 mg tablet take 1 tablet by oral route  every 6 hours as needed for pain N      02/16/2023 ibuprofen 800 mg tablet take 1 tablet by oral route 3 times every day with food prn pain N        Prescription Drug Monitoring Report: Accessed by Daryl Shook MD on 2/16/2023 10:25:09 AM      Provider:    Daryl Shook MD 02/16/2023 10:30 AM     Document generated by: Daryl Shook 02/16/2023 10:30 AM      ----------------------------------------------------------------------------------------------------------------------------------------------------------------------      Electronically signed by Daryl Shook MD on 02/16/2023 10:36 AM

## 2023-03-07 ENCOUNTER — ANESTHESIA (OUTPATIENT)
Facility: HOSPITAL | Age: 38
End: 2023-03-07
Payer: COMMERCIAL

## 2023-03-07 ENCOUNTER — HOSPITAL ENCOUNTER (OUTPATIENT)
Facility: HOSPITAL | Age: 38
Setting detail: OUTPATIENT SURGERY
Discharge: HOME | End: 2023-03-07
Attending: OBSTETRICS & GYNECOLOGY | Admitting: OBSTETRICS & GYNECOLOGY
Payer: COMMERCIAL

## 2023-03-07 VITALS
SYSTOLIC BLOOD PRESSURE: 132 MMHG | TEMPERATURE: 97.2 F | BODY MASS INDEX: 35.9 KG/M2 | HEIGHT: 63 IN | WEIGHT: 202.6 LBS | HEART RATE: 80 BPM | DIASTOLIC BLOOD PRESSURE: 78 MMHG | OXYGEN SATURATION: 100 % | RESPIRATION RATE: 12 BRPM

## 2023-03-07 PROBLEM — N92.0 MENORRHAGIA: Status: ACTIVE | Noted: 2023-03-07

## 2023-03-07 PROBLEM — N92.0 MENORRHAGIA: Status: RESOLVED | Noted: 2023-03-07 | Resolved: 2023-03-07

## 2023-03-07 PROBLEM — D25.9 LEIOMYOMA OF UTERUS, UNSPECIFIED: Chronic | Status: ACTIVE | Noted: 2023-03-07

## 2023-03-07 PROBLEM — D25.9 LEIOMYOMA OF UTERUS, UNSPECIFIED: Chronic | Status: RESOLVED | Noted: 2023-03-07 | Resolved: 2023-03-07

## 2023-03-07 LAB
ABO + RH BLD: NORMAL
BLOOD GROUP ANTIBODIES SERPL: NORMAL
HCG UR QL: NEGATIVE
SPECIMEN EXP DATE BLD: NORMAL

## 2023-03-07 PROCEDURE — 88307 TISSUE EXAM BY PATHOLOGIST: CPT

## 2023-03-07 PROCEDURE — 3700000001 HC ADD 15 MINUTES (ANESTHESIA): Performed by: OBSTETRICS & GYNECOLOGY

## 2023-03-07 PROCEDURE — 7100000011 HC PHASE II RECOVERY - ADDTL 15 MIN: Performed by: OBSTETRICS & GYNECOLOGY

## 2023-03-07 PROCEDURE — 6360000002 HC RX W HCPCS: Performed by: OBSTETRICS & GYNECOLOGY

## 2023-03-07 PROCEDURE — 7100000010 HC PHASE II RECOVERY - FIRST 15 MIN: Performed by: OBSTETRICS & GYNECOLOGY

## 2023-03-07 PROCEDURE — 6360000002 HC RX W HCPCS

## 2023-03-07 PROCEDURE — 2580000003 HC RX 258: Performed by: OBSTETRICS & GYNECOLOGY

## 2023-03-07 PROCEDURE — S2900 ROBOTIC SURGICAL SYSTEM: HCPCS | Performed by: OBSTETRICS & GYNECOLOGY

## 2023-03-07 PROCEDURE — 36415 COLL VENOUS BLD VENIPUNCTURE: CPT

## 2023-03-07 PROCEDURE — 7100000001 HC PACU RECOVERY - ADDTL 15 MIN: Performed by: OBSTETRICS & GYNECOLOGY

## 2023-03-07 PROCEDURE — 88331 PATH CONSLTJ SURG 1 BLK 1SPC: CPT

## 2023-03-07 PROCEDURE — 86900 BLOOD TYPING SEROLOGIC ABO: CPT

## 2023-03-07 PROCEDURE — 88305 TISSUE EXAM BY PATHOLOGIST: CPT

## 2023-03-07 PROCEDURE — 2500000003 HC RX 250 WO HCPCS: Performed by: OBSTETRICS & GYNECOLOGY

## 2023-03-07 PROCEDURE — 7100000000 HC PACU RECOVERY - FIRST 15 MIN: Performed by: OBSTETRICS & GYNECOLOGY

## 2023-03-07 PROCEDURE — 81025 URINE PREGNANCY TEST: CPT

## 2023-03-07 PROCEDURE — 3700000000 HC ANESTHESIA ATTENDED CARE: Performed by: OBSTETRICS & GYNECOLOGY

## 2023-03-07 PROCEDURE — 2500000003 HC RX 250 WO HCPCS

## 2023-03-07 PROCEDURE — 6370000000 HC RX 637 (ALT 250 FOR IP): Performed by: ANESTHESIOLOGY

## 2023-03-07 PROCEDURE — 3600000019 HC SURGERY ROBOT ADDTL 15MIN: Performed by: OBSTETRICS & GYNECOLOGY

## 2023-03-07 PROCEDURE — 2720000010 HC SURG SUPPLY STERILE: Performed by: OBSTETRICS & GYNECOLOGY

## 2023-03-07 PROCEDURE — 2709999900 HC NON-CHARGEABLE SUPPLY: Performed by: OBSTETRICS & GYNECOLOGY

## 2023-03-07 PROCEDURE — 3600000009 HC SURGERY ROBOT BASE: Performed by: OBSTETRICS & GYNECOLOGY

## 2023-03-07 RX ORDER — ROCURONIUM BROMIDE 10 MG/ML
INJECTION, SOLUTION INTRAVENOUS PRN
Status: DISCONTINUED | OUTPATIENT
Start: 2023-03-07 | End: 2023-03-07 | Stop reason: SDUPTHER

## 2023-03-07 RX ORDER — NEOSTIGMINE METHYLSULFATE 1 MG/ML
INJECTION, SOLUTION INTRAVENOUS PRN
Status: DISCONTINUED | OUTPATIENT
Start: 2023-03-07 | End: 2023-03-07 | Stop reason: SDUPTHER

## 2023-03-07 RX ORDER — ONDANSETRON 2 MG/ML
INJECTION INTRAMUSCULAR; INTRAVENOUS PRN
Status: DISCONTINUED | OUTPATIENT
Start: 2023-03-07 | End: 2023-03-07 | Stop reason: SDUPTHER

## 2023-03-07 RX ORDER — FENTANYL CITRATE 50 UG/ML
INJECTION, SOLUTION INTRAMUSCULAR; INTRAVENOUS PRN
Status: DISCONTINUED | OUTPATIENT
Start: 2023-03-07 | End: 2023-03-07 | Stop reason: SDUPTHER

## 2023-03-07 RX ORDER — LABETALOL HYDROCHLORIDE 5 MG/ML
10 INJECTION, SOLUTION INTRAVENOUS
Status: DISCONTINUED | OUTPATIENT
Start: 2023-03-07 | End: 2023-03-07 | Stop reason: HOSPADM

## 2023-03-07 RX ORDER — HYDRALAZINE HYDROCHLORIDE 20 MG/ML
10 INJECTION INTRAMUSCULAR; INTRAVENOUS
Status: DISCONTINUED | OUTPATIENT
Start: 2023-03-07 | End: 2023-03-07 | Stop reason: HOSPADM

## 2023-03-07 RX ORDER — MEPERIDINE HYDROCHLORIDE 50 MG/ML
12.5 INJECTION INTRAMUSCULAR; INTRAVENOUS; SUBCUTANEOUS ONCE
Status: DISCONTINUED | OUTPATIENT
Start: 2023-03-07 | End: 2023-03-07 | Stop reason: HOSPADM

## 2023-03-07 RX ORDER — HYDROMORPHONE HYDROCHLORIDE 1 MG/ML
0.5 INJECTION, SOLUTION INTRAMUSCULAR; INTRAVENOUS; SUBCUTANEOUS EVERY 5 MIN PRN
Status: DISCONTINUED | OUTPATIENT
Start: 2023-03-07 | End: 2023-03-07 | Stop reason: HOSPADM

## 2023-03-07 RX ORDER — OXYCODONE HYDROCHLORIDE 5 MG/1
10 TABLET ORAL PRN
Status: DISCONTINUED | OUTPATIENT
Start: 2023-03-07 | End: 2023-03-07 | Stop reason: HOSPADM

## 2023-03-07 RX ORDER — MIDAZOLAM HYDROCHLORIDE 1 MG/ML
INJECTION INTRAMUSCULAR; INTRAVENOUS PRN
Status: DISCONTINUED | OUTPATIENT
Start: 2023-03-07 | End: 2023-03-07 | Stop reason: SDUPTHER

## 2023-03-07 RX ORDER — DEXAMETHASONE SODIUM PHOSPHATE 4 MG/ML
INJECTION, SOLUTION INTRA-ARTICULAR; INTRALESIONAL; INTRAMUSCULAR; INTRAVENOUS; SOFT TISSUE PRN
Status: DISCONTINUED | OUTPATIENT
Start: 2023-03-07 | End: 2023-03-07 | Stop reason: SDUPTHER

## 2023-03-07 RX ORDER — ACETAMINOPHEN 500 MG
1000 TABLET ORAL ONCE
Status: COMPLETED | OUTPATIENT
Start: 2023-03-07 | End: 2023-03-07

## 2023-03-07 RX ORDER — SODIUM CHLORIDE 0.9 % (FLUSH) 0.9 %
5-40 SYRINGE (ML) INJECTION EVERY 12 HOURS SCHEDULED
Status: DISCONTINUED | OUTPATIENT
Start: 2023-03-07 | End: 2023-03-07 | Stop reason: HOSPADM

## 2023-03-07 RX ORDER — ONDANSETRON 2 MG/ML
4 INJECTION INTRAMUSCULAR; INTRAVENOUS
Status: DISCONTINUED | OUTPATIENT
Start: 2023-03-07 | End: 2023-03-07 | Stop reason: HOSPADM

## 2023-03-07 RX ORDER — LORAZEPAM 2 MG/ML
0.5 INJECTION INTRAMUSCULAR ONCE
Status: DISCONTINUED | OUTPATIENT
Start: 2023-03-07 | End: 2023-03-07 | Stop reason: HOSPADM

## 2023-03-07 RX ORDER — SODIUM CHLORIDE 9 MG/ML
INJECTION, SOLUTION INTRAVENOUS PRN
Status: DISCONTINUED | OUTPATIENT
Start: 2023-03-07 | End: 2023-03-07 | Stop reason: HOSPADM

## 2023-03-07 RX ORDER — LIDOCAINE HYDROCHLORIDE 20 MG/ML
INJECTION, SOLUTION EPIDURAL; INFILTRATION; INTRACAUDAL; PERINEURAL PRN
Status: DISCONTINUED | OUTPATIENT
Start: 2023-03-07 | End: 2023-03-07 | Stop reason: SDUPTHER

## 2023-03-07 RX ORDER — DIPHENHYDRAMINE HYDROCHLORIDE 50 MG/ML
12.5 INJECTION INTRAMUSCULAR; INTRAVENOUS
Status: DISCONTINUED | OUTPATIENT
Start: 2023-03-07 | End: 2023-03-07 | Stop reason: HOSPADM

## 2023-03-07 RX ORDER — SODIUM CHLORIDE, SODIUM LACTATE, POTASSIUM CHLORIDE, CALCIUM CHLORIDE 600; 310; 30; 20 MG/100ML; MG/100ML; MG/100ML; MG/100ML
INJECTION, SOLUTION INTRAVENOUS CONTINUOUS
Status: DISCONTINUED | OUTPATIENT
Start: 2023-03-07 | End: 2023-03-07 | Stop reason: HOSPADM

## 2023-03-07 RX ORDER — BUPIVACAINE HYDROCHLORIDE 2.5 MG/ML
INJECTION, SOLUTION EPIDURAL; INFILTRATION; INTRACAUDAL PRN
Status: DISCONTINUED | OUTPATIENT
Start: 2023-03-07 | End: 2023-03-07 | Stop reason: ALTCHOICE

## 2023-03-07 RX ORDER — GLYCOPYRROLATE 0.2 MG/ML
INJECTION INTRAMUSCULAR; INTRAVENOUS PRN
Status: DISCONTINUED | OUTPATIENT
Start: 2023-03-07 | End: 2023-03-07 | Stop reason: SDUPTHER

## 2023-03-07 RX ORDER — SODIUM CHLORIDE 0.9 % (FLUSH) 0.9 %
5-40 SYRINGE (ML) INJECTION PRN
Status: DISCONTINUED | OUTPATIENT
Start: 2023-03-07 | End: 2023-03-07 | Stop reason: HOSPADM

## 2023-03-07 RX ORDER — KETOROLAC TROMETHAMINE 30 MG/ML
INJECTION, SOLUTION INTRAMUSCULAR; INTRAVENOUS PRN
Status: DISCONTINUED | OUTPATIENT
Start: 2023-03-07 | End: 2023-03-07 | Stop reason: SDUPTHER

## 2023-03-07 RX ORDER — FENTANYL CITRATE 50 UG/ML
25 INJECTION, SOLUTION INTRAMUSCULAR; INTRAVENOUS EVERY 5 MIN PRN
Status: DISCONTINUED | OUTPATIENT
Start: 2023-03-07 | End: 2023-03-07 | Stop reason: HOSPADM

## 2023-03-07 RX ORDER — PROPOFOL 10 MG/ML
INJECTION, EMULSION INTRAVENOUS PRN
Status: DISCONTINUED | OUTPATIENT
Start: 2023-03-07 | End: 2023-03-07 | Stop reason: SDUPTHER

## 2023-03-07 RX ORDER — DROPERIDOL 2.5 MG/ML
0.62 INJECTION, SOLUTION INTRAMUSCULAR; INTRAVENOUS
Status: DISCONTINUED | OUTPATIENT
Start: 2023-03-07 | End: 2023-03-07 | Stop reason: HOSPADM

## 2023-03-07 RX ORDER — KETAMINE HCL IN NACL, ISO-OSM 100MG/10ML
SYRINGE (ML) INJECTION PRN
Status: DISCONTINUED | OUTPATIENT
Start: 2023-03-07 | End: 2023-03-07 | Stop reason: SDUPTHER

## 2023-03-07 RX ORDER — OXYCODONE HYDROCHLORIDE 5 MG/1
5 TABLET ORAL PRN
Status: DISCONTINUED | OUTPATIENT
Start: 2023-03-07 | End: 2023-03-07 | Stop reason: HOSPADM

## 2023-03-07 RX ADMIN — Medication 20 MG: at 11:09

## 2023-03-07 RX ADMIN — FENTANYL CITRATE 50 MCG: 50 INJECTION, SOLUTION INTRAMUSCULAR; INTRAVENOUS at 10:49

## 2023-03-07 RX ADMIN — DEXAMETHASONE SODIUM PHOSPHATE 8 MG: 4 INJECTION, SOLUTION INTRAMUSCULAR; INTRAVENOUS at 10:57

## 2023-03-07 RX ADMIN — HYDROMORPHONE HYDROCHLORIDE 0.5 MG: 1 INJECTION, SOLUTION INTRAMUSCULAR; INTRAVENOUS; SUBCUTANEOUS at 11:37

## 2023-03-07 RX ADMIN — ACETAMINOPHEN 1000 MG: 500 TABLET ORAL at 09:50

## 2023-03-07 RX ADMIN — ROCURONIUM BROMIDE 40 MG: 10 INJECTION, SOLUTION INTRAVENOUS at 10:54

## 2023-03-07 RX ADMIN — ROCURONIUM BROMIDE 10 MG: 10 INJECTION, SOLUTION INTRAVENOUS at 11:30

## 2023-03-07 RX ADMIN — SODIUM CHLORIDE, POTASSIUM CHLORIDE, SODIUM LACTATE AND CALCIUM CHLORIDE: 600; 310; 30; 20 INJECTION, SOLUTION INTRAVENOUS at 09:59

## 2023-03-07 RX ADMIN — MIDAZOLAM 2 MG: 1 INJECTION INTRAMUSCULAR; INTRAVENOUS at 10:46

## 2023-03-07 RX ADMIN — HYDROMORPHONE HYDROCHLORIDE 0.5 MG: 1 INJECTION, SOLUTION INTRAMUSCULAR; INTRAVENOUS; SUBCUTANEOUS at 11:51

## 2023-03-07 RX ADMIN — FENTANYL CITRATE 50 MCG: 50 INJECTION, SOLUTION INTRAMUSCULAR; INTRAVENOUS at 11:11

## 2023-03-07 RX ADMIN — PROPOFOL 200 MG: 10 INJECTION, EMULSION INTRAVENOUS at 10:53

## 2023-03-07 RX ADMIN — ONDANSETRON HYDROCHLORIDE 4 MG: 2 INJECTION INTRAMUSCULAR; INTRAVENOUS at 11:54

## 2023-03-07 RX ADMIN — Medication 20 MG: at 11:24

## 2023-03-07 RX ADMIN — KETOROLAC TROMETHAMINE 30 MG: 30 INJECTION, SOLUTION INTRAMUSCULAR; INTRAVENOUS at 11:54

## 2023-03-07 RX ADMIN — Medication 3 MG: at 11:56

## 2023-03-07 RX ADMIN — Medication 2000 MG: at 10:57

## 2023-03-07 RX ADMIN — Medication 10 MG: at 11:30

## 2023-03-07 RX ADMIN — LIDOCAINE HYDROCHLORIDE 100 MG: 20 INJECTION, SOLUTION EPIDURAL; INFILTRATION; INTRACAUDAL; PERINEURAL at 10:53

## 2023-03-07 RX ADMIN — GLYCOPYRROLATE 0.3 MG: 0.2 INJECTION INTRAMUSCULAR; INTRAVENOUS at 11:56

## 2023-03-07 ASSESSMENT — PAIN DESCRIPTION - LOCATION: LOCATION: ABDOMEN

## 2023-03-07 ASSESSMENT — PAIN DESCRIPTION - DESCRIPTORS: DESCRIPTORS: ACHING

## 2023-03-07 ASSESSMENT — PAIN SCALES - GENERAL
PAINLEVEL_OUTOF10: 0
PAINLEVEL_OUTOF10: 3

## 2023-03-07 NOTE — ANESTHESIA PRE PROCEDURE
Department of Anesthesiology  Preprocedure Note       Name:  Sujata Jensen   Age:  40 y.o.  :  1985                                          MRN:  [id number]         Date:  3/7/2023      Surgeon: Claudio Blackburn):  Sravan Duckworth MD    Procedure: Procedure(s):  ROBOTIC TOTAL LAPAROSCOPIC HYSTERECTOMY, DILATATION AND CURETTAGE WITH FROZEN SECTION BILATERAL SALPINGECTOMIES, CYSTOSCOPY    Medications prior to admission:   Prior to Admission medications    Medication Sig Start Date End Date Taking?  Authorizing Provider   Prenatal Vit-Fe Fumarate-FA (PRENATAL VITAMIN) 27-0.8 MG TABS Take by mouth    Historical Provider, MD       Current medications:    Current Facility-Administered Medications   Medication Dose Route Frequency Provider Last Rate Last Admin    lactated ringers IV soln infusion   IntraVENous Continuous Sravan Duckworth MD 50 mL/hr at 23 0959 New Bag at 23 0959    lactated ringers IV soln infusion   IntraVENous Continuous Sravan Duckworth  mL/hr at 23 0959 New Bag at 23 0959    ceFAZolin (ANCEF) 2000 mg in sterile water 20 mL IV syringe  2,000 mg IntraVENous Once Sravan Duckworth MD           Allergies:  No Known Allergies    Problem List:    Patient Active Problem List   Diagnosis Code    Fetal heart rate decelerations affecting management of mother X73.5813    35 weeks gestation of pregnancy Z3A.33    34 weeks gestation of pregnancy Z3A.34    IUGR (intrauterine growth restriction) affecting care of mother O41.80    Normal IUP (intrauterine pregnancy) on prenatal ultrasound, third trimester Z34.93    IUP (intrauterine pregnancy), incidental Z33.1    Category II fetal heart rate tracing during labor and delivery O76    Menorrhagia N92.0    Leiomyoma of uterus, unspecified D25.9       Past Medical History:        Diagnosis Date    Abnormal Papanicolaou smear of cervix     Activities involving walking 2023    some weightlifting; denies sob with exertion/stairs    Advance directive discussed with patient 02/28/2023    declines    Anemia     Leiomyoma 2023       Past Surgical History:        Procedure Laterality Date    MYOMECTOMY  2017    OTHER SURGICAL HISTORY N/A 2018    leap procedure       Social History:    Social History     Tobacco Use    Smoking status: Never     Passive exposure: Past ( smokes outside)    Smokeless tobacco: Never   Substance Use Topics    Alcohol use: Yes     Alcohol/week: 1.0 standard drink     Types: 1 Glasses of wine per week                                Counseling given: Not Answered      Vital Signs (Current):   Vitals:    03/07/23 0926   BP: (!) 144/98   Pulse: 79   Resp: 16   Temp: 97 °F (36.1 °C)   TempSrc: Temporal   SpO2: 100%   Weight: 202 lb 9.6 oz (91.9 kg)   Height: 5' 3\" (1.6 m)                                              BP Readings from Last 3 Encounters:   03/07/23 (!) 144/98       NPO Status: Time of last liquid consumption: 2359                        Time of last solid consumption: 2359                        Date of last liquid consumption: 03/06/23                        Date of last solid food consumption: 03/06/23    BMI:   Wt Readings from Last 3 Encounters:   03/07/23 202 lb 9.6 oz (91.9 kg)   02/28/23 204 lb (92.5 kg)     Body mass index is 35.89 kg/m². CBC:   Lab Results   Component Value Date/Time    WBC 7.5 02/24/2023 02:48 PM    RBC 4.54 02/24/2023 02:48 PM    HGB 14.3 02/24/2023 02:48 PM    HCT 43.2 02/24/2023 02:48 PM    MCV 95.2 02/24/2023 02:48 PM    RDW 11.9 02/24/2023 02:48 PM     02/24/2023 02:48 PM       CMP: No results found for: NA, K, CL, CO2, BUN, CREATININE, GFRAA, AGRATIO, LABGLOM, GLUCOSE, GLU, PROT, CALCIUM, BILITOT, ALKPHOS, AST, ALT    POC Tests: No results for input(s): POCGLU, POCNA, POCK, POCCL, POCBUN, POCHEMO, POCHCT in the last 72 hours.     Coags: No results found for: PROTIME, INR, APTT    HCG (If Applicable):   Lab Results   Component Value Date PREGTESTUR Negative 03/07/2023        ABGs: No results found for: PHART, PO2ART, CAO1EJY, QAJ7LLX, BEART, T2EAGOQB     Type & Screen (If Applicable):  No results found for: LABABO, LABRH    Drug/Infectious Status (If Applicable):  No results found for: HIV, HEPCAB    COVID-19 Screening (If Applicable): No results found for: COVID19        Anesthesia Evaluation  Patient summary reviewed and Nursing notes reviewed  Airway: Mallampati: I  TM distance: >3 FB   Neck ROM: full     Dental: normal exam         Pulmonary:Negative Pulmonary ROS                              Cardiovascular:Negative CV ROS  Exercise tolerance: good (>4 METS),                     Neuro/Psych:   Negative Neuro/Psych ROS              GI/Hepatic/Renal: Neg GI/Hepatic/Renal ROS            Endo/Other: Negative Endo/Other ROS                    Abdominal:             Vascular: Other Findings:           Anesthesia Plan      general         Induction: intravenous. Anesthetic plan and risks discussed with patient.                         Hesham Alvarez MD   3/7/2023

## 2023-03-07 NOTE — ANESTHESIA POSTPROCEDURE EVALUATION
Post-Anesthesia Evaluation and Assessment    Cardiovascular Function/Vital Signs  Visit Vitals  /88   Pulse 57   Temp 97.9 °F (36.6 °C) (Skin)   Resp 10   Ht 5' 3\" (1.6 m)   Wt 202 lb 9.6 oz (91.9 kg)   SpO2 100%   BMI 35.89 kg/m²       Patient is status post Procedure(s):  ROBOTIC TOTAL LAPAROSCOPIC HYSTERECTOMY, DILATATION AND CURETTAGE WITH FROZEN SECTION, BILATERAL SALPINGECTOMIES, CYSTOSCOPY. Nausea/Vomiting: Controlled. Postoperative hydration reviewed and adequate. Pain:      Managed. Neurological Status: At baseline. Mental Status and Level of Consciousness: Arousable. Pulmonary Status:       Adequate oxygenation and airway patent. Complications related to anesthesia: None    Post-anesthesia assessment completed. No concerns. Patient has met all discharge requirements.     Signed By: John Hutchinson MD    March 7, 2023

## 2023-03-07 NOTE — PERIOP NOTE
TRANSFER - OUT REPORT:    Verbal report given to MS NORA OF [facility] RN on Phill Lawrence  being transferred to PHASE 2 for routine post-op       Report consisted of patient's Situation, Background, Assessment and   Recommendations(SBAR). Information from the following report(s) Nurse Handoff Report, Intake/Output, and MAR was reviewed with the receiving nurse. [place] Assessment: No data recorded  Lines:   Peripheral IV 03/07/23 Right;Posterior Forearm (Active)   Site Assessment Clean, dry & intact 03/07/23 1230   Line Status Infusing 03/07/23 1230   Phlebitis Assessment No symptoms 03/07/23 1230   Infiltration Assessment 0 03/07/23 1230   Alcohol Cap Used No 03/07/23 0959   Dressing Status Clean, dry & intact 03/07/23 1230   Dressing Type Transparent 03/07/23 1230   Dressing Intervention New 03/07/23 0959       Peripheral IV 03/07/23 Left;Posterior Hand (Active)   Site Assessment Clean, dry & intact 03/07/23 1230   Line Status Infusing 03/07/23 1230   Phlebitis Assessment No symptoms 03/07/23 1230   Infiltration Assessment 0 03/07/23 1230   Alcohol Cap Used Yes 03/07/23 0958   Dressing Status Clean, dry & intact 03/07/23 1230   Dressing Type Transparent 03/07/23 1230   Dressing Intervention New 03/07/23 0958        Opportunity for questions and clarification was provided.       Patient transported with:  Registered Nurse

## 2023-03-07 NOTE — PERIOP NOTE
TRANSFER - IN REPORT:    Verbal report received from Aden Malhotra RN on Suha Luu  being received from PACU for routine progression of patient care      Report consisted of patient's Situation, Background, Assessment and   Recommendations(SBAR). Information from the following report(s) Nurse Handoff Report, Surgery Report, Intake/Output, and MAR was reviewed with the receiving nurse. Opportunity for questions and clarification was provided. Assessment completed upon patient's arrival to unit and care assumed.

## 2023-03-07 NOTE — PERIOP NOTE
TRANSFER - IN REPORT:    Verbal report received from CRNA AND RN on [id number]  being received from OR for routine post-op      Report consisted of patient's Situation, Background, Assessment and   Recommendations(SBAR). Information from the following report(s) Nurse Handoff Report, Intake/Output, and MAR was reviewed with the receiving nurse. Opportunity for questions and clarification was provided. Assessment completed upon patient's arrival to unit and care assumed.

## 2023-03-07 NOTE — PERIOP NOTE
Discharge instructions given to patient. Opportunities for questions provided.    Awaiting to give discharge instructions to  by telephone.

## 2023-03-07 NOTE — INTERVAL H&P NOTE
Office H+P reviewed and updated. Pt seen and examined. No changes. Scanned to Epic.   Pat Martell M.D.

## 2023-03-07 NOTE — PERIOP NOTE
Reviewed PTA medication list with patient/caregiver and patient/caregiver denies any additional medications. Patient admits to having a responsible adult care for them at home for at least 24 hours after surgery. Patient encouraged to use gown warming system and informed that using said warming gown to regulate body temperature prior to a procedure has been shown to help reduce the risks of blood clots and infection. Patient's pharmacy of choice verified and documented in PTA medication section. Dual skin assessment & fall risk band verification completed with Wanda Stephenson RN.

## 2023-03-07 NOTE — DISCHARGE INSTRUCTIONS
[facility], [address] [facility], [facility] [address], [address]  [facility], [address] [facility], [address] [place] [address], [place], [address] [facility]  Office: [phone]  Fax:    [phone]    PROCEDURE: Procedure(s):  ROBOTIC TOTAL LAPAROSCOPIC HYSTERECTOMY, DILATATION AND CURETTAGE WITH FROZEN SECTION, BILATERAL SALPINGECTOMIES, CYSTOSCOPY    NOTIFY [facility] IMMEDIATELY IF ANY OF THE FOLLOWING OCCUR:    You are unable to urinate. Urgency to urinate is not uncommon. Excessive vaginal bleeding > 1 maxi pad an hour for more then 2 hours straight. Temperature above 101.0° and / or chills. You are nauseous and / or vomiting and you cannot hold down any fluids. Your pain is not controlled with the pain medication prescribed. SPECIAL CONSIDERATIONS:     Do not drive for at least 24 hours after the procedure and until you are no longer taking narcotic pain medication and you are able to move and react without hesitation. You may return to work in 2 weeks. [x] Pelvic rest (nothing in the vagina) for 6 weeks. [x] No heavy lifting over 10 pounds & no strenuous exercise for 2 weeks. [] Other instructions:         MEDICATIONS: PROVIDED AT DISCHARGE OR PREVIOUSLY PRESCRIBED AT PRE OPERATIVE APPOINTMENT WITH [facility] --  Narcotic Pain Med. [x]  Norco/Vicodin   []  Percocet®   []  Dilaudid®    Non-narcotic Pain Med. [x]   Ibuprofen        Antibiotics   []  Cipro®   []  Keflex®     []  Bactrim DS®       Urgency   []   Vesicare®       Nausea      []    Zofran®     []    Phenergan®       Other   []    Colace          [x] Rx provided to patient at pre operative appointment  [] Rx sent electronically today  [] Rx printed today      Our office staff will call you for a post operative check in 1-2 days. If you have not already scheduled your post operative appointment please do so with our office staff during this phone call.  Your post operative appointment should be in 2 weeks. Please contact [place] [address] [facility] at [phone] or go to the nearest Emergency Department / Urgent Care facility for any other medical questions or concerns. DISCHARGE SUMMARY from Nurse    PATIENT INSTRUCTIONS:    After general anesthesia or intravenous sedation, for 24 hours or while taking prescription Narcotics:  Limit your activities  Do not drive and operate hazardous machinery  Do not make important personal or business decisions  Do  not drink alcoholic beverages  If you have not urinated within 8 hours after discharge, please contact your surgeon on call. What to do at Home:  Recommended activity: activity as tolerated and no driving for today and ambulate in house, see above*      *  Please give a list of your current medications to your Primary Care Provider. *  Please update this list whenever your medications are discontinued, doses are      changed, or new medications (including over-the-counter products) are added. *  Please carry medication information at all times in case of emergency situations. These are general instructions for a healthy lifestyle:    No smoking/ No tobacco products/ Avoid exposure to second hand smoke  Surgeon General's Warning:  Quitting smoking now greatly reduces serious risk to your health. Obesity, smoking, and sedentary lifestyle greatly increases your risk for illness    A healthy diet, regular physical exercise & weight monitoring are important for maintaining a healthy lifestyle    You may be retaining fluid if you have a history of heart failure or if you experience any of the following symptoms:  Weight gain of 3 pounds or more overnight or 5 pounds in a week, increased swelling in our hands or feet or shortness of breath while lying flat in bed. Please call your doctor as soon as you notice any of these symptoms; do not wait until your next office visit.         The discharge information has been reviewed with the patient and caregiver. The patient and caregiver verbalized understanding. Discharge medications reviewed with the patient and caregiver and appropriate educational materials and side effects teaching were provided.   ___________________________________________________________________________________________________________________________________

## 2023-03-07 NOTE — ANESTHESIA PRE PROCEDURE
Department of Anesthesiology  Preprocedure Note       Name:  Anne Mi   Age:  40 y.o.  :  1985                                          MRN:  [id number]         Date:  3/7/2023      Surgeon: Jessie Mohs):  Rafaela Mayen MD    Procedure: Procedure(s):  ROBOTIC TOTAL LAPAROSCOPIC HYSTERECTOMY, DILATATION AND CURETTAGE WITH FROZEN SECTION BILATERAL SALPINGECTOMIES, CYSTOSCOPY    Medications prior to admission:   Prior to Admission medications    Medication Sig Start Date End Date Taking?  Authorizing Provider   Prenatal Vit-Fe Fumarate-FA (PRENATAL VITAMIN) 27-0.8 MG TABS Take by mouth    Historical Provider, MD       Current medications:    Current Facility-Administered Medications   Medication Dose Route Frequency Provider Last Rate Last Admin    lactated ringers IV soln infusion   IntraVENous Continuous Rafaela Mayen MD 50 mL/hr at 23 0959 New Bag at 23 0959    lactated ringers IV soln infusion   IntraVENous Continuous Rafaela Mayen  mL/hr at 23 0959 New Bag at 23 0959    ceFAZolin (ANCEF) 2000 mg in sterile water 20 mL IV syringe  2,000 mg IntraVENous Once Rafaela Mayen MD           Allergies:  No Known Allergies    Problem List:    Patient Active Problem List   Diagnosis Code    Fetal heart rate decelerations affecting management of mother G62.1429    35 weeks gestation of pregnancy Z3A.33    34 weeks gestation of pregnancy Z3A.34    IUGR (intrauterine growth restriction) affecting care of mother O41.80    Normal IUP (intrauterine pregnancy) on prenatal ultrasound, third trimester Z34.93    IUP (intrauterine pregnancy), incidental Z33.1    Category II fetal heart rate tracing during labor and delivery O76    Menorrhagia N92.0    Leiomyoma of uterus, unspecified D25.9       Past Medical History:        Diagnosis Date    Abnormal Papanicolaou smear of cervix     Activities involving walking 2023    some weightlifting; denies sob with exertion/stairs    Advance directive discussed with patient 02/28/2023    declines    Anemia     Leiomyoma 2023       Past Surgical History:        Procedure Laterality Date    MYOMECTOMY  2017    OTHER SURGICAL HISTORY N/A 2018    leap procedure       Social History:    Social History     Tobacco Use    Smoking status: Never     Passive exposure: Past ( smokes outside)    Smokeless tobacco: Never   Substance Use Topics    Alcohol use: Yes     Alcohol/week: 1.0 standard drink     Types: 1 Glasses of wine per week                                Counseling given: Not Answered      Vital Signs (Current):   Vitals:    03/07/23 0926   BP: (!) 144/98   Pulse: 79   Resp: 16   Temp: 97 °F (36.1 °C)   TempSrc: Temporal   SpO2: 100%   Weight: 202 lb 9.6 oz (91.9 kg)   Height: 5' 3\" (1.6 m)                                              BP Readings from Last 3 Encounters:   03/07/23 (!) 144/98       NPO Status: Time of last liquid consumption: 2359                        Time of last solid consumption: 2359                        Date of last liquid consumption: 03/06/23                        Date of last solid food consumption: 03/06/23    BMI:   Wt Readings from Last 3 Encounters:   03/07/23 202 lb 9.6 oz (91.9 kg)   02/28/23 204 lb (92.5 kg)     Body mass index is 35.89 kg/m². CBC:   Lab Results   Component Value Date/Time    WBC 7.5 02/24/2023 02:48 PM    RBC 4.54 02/24/2023 02:48 PM    HGB 14.3 02/24/2023 02:48 PM    HCT 43.2 02/24/2023 02:48 PM    MCV 95.2 02/24/2023 02:48 PM    RDW 11.9 02/24/2023 02:48 PM     02/24/2023 02:48 PM       CMP: No results found for: NA, K, CL, CO2, BUN, CREATININE, GFRAA, AGRATIO, LABGLOM, GLUCOSE, GLU, PROT, CALCIUM, BILITOT, ALKPHOS, AST, ALT    POC Tests: No results for input(s): POCGLU, POCNA, POCK, POCCL, POCBUN, POCHEMO, POCHCT in the last 72 hours.     Coags: No results found for: PROTIME, INR, APTT    HCG (If Applicable):   Lab Results   Component Value Date PREGTESTUR Negative 03/07/2023        ABGs: No results found for: PHART, PO2ART, HVW8AFZ, WCR6BSM, BEART, P7MERTSY     Type & Screen (If Applicable):  No results found for: LABABO, LABRH    Drug/Infectious Status (If Applicable):  No results found for: HIV, HEPCAB    COVID-19 Screening (If Applicable): No results found for: COVID19        Anesthesia Evaluation  Patient summary reviewed and Nursing notes reviewed  Airway:           Dental:          Pulmonary:Negative Pulmonary ROS                              Cardiovascular:                      Neuro/Psych:   Negative Neuro/Psych ROS              GI/Hepatic/Renal:             Endo/Other:                     Abdominal:             Vascular: Other Findings:           Anesthesia Plan      general     ASA 1       Induction: intravenous. Anesthetic plan and risks discussed with patient.                         Hesham Alvarez MD   3/7/2023

## 2023-03-07 NOTE — PERIOP NOTE
Patent stated that she did not receive the Norco prescription. Patient checked with her pharmacy ([facility] on [address]) and the medication wasn't there. Notified Dr. Kindra King - she will send another prescription. Informed patient and patient's .

## 2023-03-07 NOTE — OP NOTE
Patient: Edmond Avilez  MRN: [id number]  : 1985  Pre Operative Diagnosis: LEIOMYOMA  Post Operative Diagnosis:  MICHELLE  Procedure: Procedure(s):  ROBOTIC TOTAL LAPAROSCOPIC HYSTERECTOMY, DILATATION AND CURETTAGE WITH FROZEN SECTION, BILATERAL SALPINGECTOMIES, CYSTOSCOPY  Surgeon: Surgeon(s) and Role:     * David Lopez MD - Primary  Surgical Assistant: Circulator: Mauricio Kirkland RN  Surgical Assistant: Phill Boots  Scrub Person First: Nicolás Levy  Anesthesia: General  Specimens:   ID Type Source Tests Collected by Time Destination   A : endometrial curettings Tissue Uterus SURGICAL PATHOLOGY David Lopez MD 3/7/2023 1054    B : UTERUS, CERVIX, BILATERAL FALLOPIAN TUBES Tissue Uterus SURGICAL PATHOLOGY David Lopez MD 3/7/2023 1127      Implants: * No implants in log *  EBL: 25 mL  Findings: 10-12 week sized uterus  Disposition: To RR, stable    Procedure:   Patient was taken to the OR after informed consent had been obtained. Surgery identification was completed with the patient and the OR team. She was then placed under GETA prepped and draped in a sterile fashion. Patient identification and surgery identification were completed with the surgeon and the OR team. Attention was turned to the vagina and a weighted speculum was placed. The anterior lip of the cervix was grasped with a single toothed tenaculum. The uterus was sounded to 11 cm. The cervix was serially dilated. D&C was performed until a gritty texture was obtained throughout the uterine cavity. The specimen was sent for frozen section pathology. A Vanessa[employer] uterine manipulator was placed in the normal fashion. All other instruments were removed. A guzman catheter was placed with clear urine noted. Attention was turned to the abdomen where a total of 10 cc of 0.25% Marcaine was injected into the infraumbilical and left and right mid abdominal  incision sites.  A 5 mm incision was made infraumbilically and a Veress needle was introduced into the abdominal cavity with anterior tenting of the abdominal wall. Pneumoperitoneum was obtained with 1.2 liters of CO2 gas. The Veress needle was removed and robotic port was placed under direct visualization without complication. The patient was placed in steep [place] and a 5 mm incision was made in the LMQ and a 5 mm incision was made in the RMQ. Robotic ports were placed under direct visualization without complication. The pelvis was examined with the above noted findings. A fourth 8 mm port was placed on the patients right inferior to the previously placed RMQ port. The [employer] robot was then positioned and attached in the normal fashion with introduction of instrumentation completed in the normal fashion. The left cornual region of the uterus was grasped and the fallopian tube was removed and the utero-ovarian, round and broad ligaments were transected with the bipolar and monopolar scissors. The uterine vessel bundle was skeletonized and transected with the Bipolar for cauterization and the Monopolar scissors for transection. The bladder flap was created across the lower uterine segment. Attention was turned to the right cornual region where the same transections were performed. The Vanessa cup was palpated and the vaginal space was entered using the monopolar scissors A circumferential incision was then completed using the monopolar scissors to remove all remaining uterine attachments. The uterus was then removed vaginally. The edges of the vaginal cuff were examined and vaginal cuff closure was then completed using the 2-0 barbed v care suture. The pelvis was copiously irrigated and all fluid was removed. Hemostasis was again noted. The ureters were identified bilaterally with normal peristalsis noted. The lateral instruments were removed and the lateral ports were removed with hemostasis noted. The laparoscope and umbilical port were then removed.  The incisions were closed with 4-0 Monocryl in a subcuticular fashion and then covered with skin glue. The guzman catheter was removed with 100 cc of clear urine noted. Cystoscopy was performed in the nl fashion with no evidence of bladder injury visible. Bilateral efflux of urine from the ureters was noted. Sponge, lap, needle and instrument counts were correct x 2. The patient was transferred to the  extubated, in stable condition. Meliza Graham M.D.      Operative Time:  45 minutes    Average Time: 45 minutes

## 2023-05-01 NOTE — PROGRESS NOTES
Progress Note                               Patient: Quintin Brennan MRN: [id number]  SSN: [id number]    YOB: 1985  Age: 40 y.o. Sex: female      2 Days Post-Op     Subjective:     Patient doing well postpartum without significant complaints. Voiding without difficulty. Patient reports normal lochia. . Breastfeeding: pumping; baby in NICU    Objective:     Patient Vitals for the past 18 hrs:   Temp Pulse Resp BP SpO2   22 0835 97.9 °F (36.6 °C) 80 16 130/78 100 %   22 2245 98 °F (36.7 °C) 90 18 127/69 100 %       Temp (24hrs), Av °F (36.7 °C), Min:97.8 °F (36.6 °C), Max:98.3 °F (36.8 °C)      Physical Exam:    Patient without distress. Abdomen: soft, nontender, nondistended, normal bowel sounds  Incision: healing well, no drainage, well approximated  Fundus: firm and mildly tender  Lower Extremities:  - Edema 1+   - No evidence of DVT seen on physical exam.     Lab/Data Review: All lab results for the last 24 hours reviewed. Assessment and Plan:     Patient appears to be having an uncomplicated post- course. Continue routine postoperative care and maternal education. Plan for d/c home tomorrow.  in NICU at this time. Retinoid Dermatitis Normal Treatment: I recommended more frequent application of Cetaphil or CeraVe to the areas of dermatitis.

## (undated) DEVICE — SUT VCRL + 0 36IN CT1 UD --

## (undated) DEVICE — SYRINGE MED 10ML LUERLOCK TIP W/O SFTY DISP

## (undated) DEVICE — GLOVE ORANGE PI 7   MSG9070

## (undated) DEVICE — TIP IU L10CM DIA6.7MM GRN SIL FLX DISP RUMI II

## (undated) DEVICE — COLUMN DRAPE

## (undated) DEVICE — DRAPE C ARM UNIV W41XL74IN CLR PLAS XR VELC CLSR POLY STRP

## (undated) DEVICE — HEX-LOCKING BLADE ELECTRODE: Brand: EDGE

## (undated) DEVICE — STAPLER SKIN SQ 30 ABSRB STPL DISP INSORB

## (undated) DEVICE — ARM DRAPE

## (undated) DEVICE — Y-TYPE TUR/BLADDER IRRIGATION SET, REGULATING CLAMP

## (undated) DEVICE — SUT MONOCRYL PLUS UD 4-0 --

## (undated) DEVICE — AIRSEAL 5 MM ACCESS PORT AND LOW PROFILE OBTURATOR WITH BLADELESS OPTICAL TIP, 120 MM LENGTH: Brand: AIRSEAL

## (undated) DEVICE — SYSTEM ES CUP DIA3.5CM PNEUMO OCCL BLLN DISP FOR CLIN POS

## (undated) DEVICE — CANNULA SEAL

## (undated) DEVICE — DERMABOND SKIN ADH 0.7ML --

## (undated) DEVICE — SOL IRRIGATION INJ NACL 0.9% 500ML BTL

## (undated) DEVICE — SET TBNG DISP TIP FOR AHTO

## (undated) DEVICE — STRAP,POSITIONING,KNEE/BODY,FOAM,4X60": Brand: MEDLINE

## (undated) DEVICE — GARMENT,MEDLINE,DVT,INT,CALF,MED, GEN2: Brand: MEDLINE

## (undated) DEVICE — 3M™ BAIR PAWS FLEX™ WARMING GOWN, SMALL, 20 PER CASE 81103: Brand: BAIR PAWS™

## (undated) DEVICE — SOLUTION IRRIG 500ML 0.9% SOD CHLO USP POUR PLAS BTL

## (undated) DEVICE — TRI-LUMEN FILTERED TUBE SET WITH ACTIVATED CHARCOAL FILTER: Brand: AIRSEAL

## (undated) DEVICE — SUT VCRL + 2-0 36IN CT1 UD --

## (undated) DEVICE — INTENDED FOR TISSUE SEPARATION, AND OTHER PROCEDURES THAT REQUIRE A SHARP SURGICAL BLADE TO PUNCTURE OR CUT.: Brand: BARD-PARKER ® CARBON RIB-BACK BLADES

## (undated) DEVICE — AGENT HEMSTAT 3GM PURIFIED PLNT STARCH PWD ABSRB ARISTA AH

## (undated) DEVICE — PAD PT POS 36 IN SURGYPAD DISP

## (undated) DEVICE — SOLUTION LACTATED RINGERS INJECTION USP

## (undated) DEVICE — SOLUTION IV D10 1000 ML INJ BG

## (undated) DEVICE — SUTURE VCRL SZ 0 L36IN ABSRB UD L36MM CT-1 1/2 CIR J946H

## (undated) DEVICE — APPLICATOR SURG XL L38CM FOR ARISTA ABSRB HEMSTAT FLEXITIP

## (undated) DEVICE — LINER,SEMI-RIGID,3000CC,50EA/CS: Brand: MEDLINE

## (undated) DEVICE — GLOVE SURG SZ 65 CRM LTX FREE POLYISOPRENE POLYMER BEAD ANTI

## (undated) DEVICE — TOWEL,OR,DSP,ST,BLUE,STD,4/PK,20PK/CS: Brand: MEDLINE

## (undated) DEVICE — ADHESIVE SKIN CLSR 0.7ML TOP DERMBND ADV

## (undated) DEVICE — ELECTRODE PT RET AD L9FT HI MOIST COND ADH HYDRGEL CORDED

## (undated) DEVICE — BSMI CSECTION BIRTHING PACK: Brand: MEDLINE INDUSTRIES, INC.

## (undated) DEVICE — REM POLYHESIVE ADULT PATIENT RETURN ELECTRODE: Brand: VALLEYLAB

## (undated) DEVICE — ROBOTIC PACK: Brand: MEDLINE INDUSTRIES, INC.

## (undated) DEVICE — SUTURE ABSORBABLE MONOFILAMENT 0 GS22 9 IN GRN V-LOC 180 VLOCL2246

## (undated) DEVICE — BLADELESS OBTURATOR: Brand: WECK VISTA

## (undated) DEVICE — COVER MPLR TIP CRV SCIS ACC DA VINCI